# Patient Record
Sex: FEMALE | URBAN - METROPOLITAN AREA
[De-identification: names, ages, dates, MRNs, and addresses within clinical notes are randomized per-mention and may not be internally consistent; named-entity substitution may affect disease eponyms.]

---

## 2024-06-25 ENCOUNTER — INPATIENT (INPATIENT)
Facility: HOSPITAL | Age: 59
LOS: 14 days | Discharge: HOME | End: 2024-07-10
Attending: STUDENT IN AN ORGANIZED HEALTH CARE EDUCATION/TRAINING PROGRAM | Admitting: STUDENT IN AN ORGANIZED HEALTH CARE EDUCATION/TRAINING PROGRAM
Payer: MEDICARE

## 2024-06-25 VITALS
RESPIRATION RATE: 16 BRPM | HEART RATE: 97 BPM | HEIGHT: 66 IN | SYSTOLIC BLOOD PRESSURE: 115 MMHG | DIASTOLIC BLOOD PRESSURE: 82 MMHG | TEMPERATURE: 98 F | WEIGHT: 110.01 LBS

## 2024-06-25 DIAGNOSIS — F33.9 MAJOR DEPRESSIVE DISORDER, RECURRENT, UNSPECIFIED: ICD-10-CM

## 2024-06-25 DIAGNOSIS — I10 ESSENTIAL (PRIMARY) HYPERTENSION: ICD-10-CM

## 2024-06-25 DIAGNOSIS — M32.9 SYSTEMIC LUPUS ERYTHEMATOSUS, UNSPECIFIED: ICD-10-CM

## 2024-06-25 RX ORDER — NICOTINE POLACRILEX 2 MG/1
2 LOZENGE ORAL ONCE
Refills: 0 | Status: COMPLETED | OUTPATIENT
Start: 2024-06-25 | End: 2024-06-25

## 2024-06-25 RX ORDER — OLANZAPINE 2.5 MG/1
5 TABLET, FILM COATED ORAL EVERY 8 HOURS
Refills: 0 | Status: DISCONTINUED | OUTPATIENT
Start: 2024-06-25 | End: 2024-07-10

## 2024-06-25 RX ORDER — NICOTINE POLACRILEX 2 MG/1
1 LOZENGE ORAL DAILY
Refills: 0 | Status: DISCONTINUED | OUTPATIENT
Start: 2024-06-25 | End: 2024-06-28

## 2024-06-25 RX ORDER — ACETAMINOPHEN 325 MG
650 TABLET ORAL EVERY 6 HOURS
Refills: 0 | Status: DISCONTINUED | OUTPATIENT
Start: 2024-06-25 | End: 2024-07-10

## 2024-06-25 RX ORDER — OLANZAPINE 2.5 MG/1
5 TABLET, FILM COATED ORAL AT BEDTIME
Refills: 0 | Status: DISCONTINUED | OUTPATIENT
Start: 2024-06-25 | End: 2024-07-05

## 2024-06-25 RX ADMIN — Medication 650 MILLIGRAM(S): at 21:58

## 2024-06-25 RX ADMIN — NICOTINE POLACRILEX 2 MILLIGRAM(S): 2 LOZENGE ORAL at 21:07

## 2024-06-25 RX ADMIN — Medication 650 MILLIGRAM(S): at 21:06
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
## 2024-06-25 NOTE — BH INPATIENT PSYCHIATRY ASSESSMENT NOTE - RISK ASSESSMENT
chronic risk factors include schizophrenia, hx of noncompliance, poor self care   no prior hx of SI/SA

## 2024-06-25 NOTE — BH PATIENT PROFILE - LAST ORAL INTAKE
[Thin] : thin [Normal] : affect appropriate [de-identified] : bilateral hearing aids [de-identified] : RRR, S1, S2, ?murmur, no rubs or gallops, occasional ectopic [de-identified] : multiple surgical scars [de-identified] : s/p left patellar fracture,  s/p right patellar fracture, s/p left fifth finger fracture, s/p right elbow fracture,  s/p left wrist fracture [de-identified] : multiple scars on abdomen,  multiple senile keratoses, scar right knee, scar left knee, soft tissue mass 2 cm on scalp unchanged 25-Jun-2024 10:45

## 2024-06-25 NOTE — BH INPATIENT PSYCHIATRY ASSESSMENT NOTE - NSBHASSESSSUMMFT_PSY_ALL_CORE
60 y/o F, domiciled alone in private residence unemployed, supportive by her sister Odalys ([phone]) who lives in [place] and is her legal guardian, PMHx for Lupus, PPHx schizophrenia, prior psych admission, last admitted to a hospital in [place] earlier this year, no known SA/NSSIB, no hx of aggression, no known substance use, no current adherent with meds or connected to OP care, who self presents to [facility] ED report recurrent bladder pain and was found to have possible UTI (however U/A was contimated as per [facility] hospitalist). On initial interview in the ED, pt was disorgnaized, paranoid, illogical and collateral from sister indicates this is how she presents when she has decompensated. she recently has not been eating or sleeping well and unable to care for safe. The pt runs off to different cities in the past when she decpmensates, where she finds herself homeless and in vulnerable situation. AS such, presented as an imminent danger to self and requires further hospitalizaton for stabilization, transferred to [facility].    #schizophrenia  -Start Zyprexa 5 mg qhs    #lupus  -f/u hospitalist, need collateral about prior tx     #UTI  -initial UA sample contaminated as per hospitalist, will get U/A + culture prior to treating as no sxs of sepsis at this time

## 2024-06-25 NOTE — BH PATIENT PROFILE - FALL HARM RISK - HARM RISK INTERVENTIONS

## 2024-06-25 NOTE — BH INPATIENT PSYCHIATRY ASSESSMENT NOTE - NSBHCHARTREVIEWVS_PSY_A_CORE FT
Vital Signs Last 24 Hrs  T(C): --  T(F): --  HR: --  BP: --  BP(mean): --  RR: --  SpO2: --     Vital Signs Last 24 Hrs  T(C): 36.7 (06-25-24 @ 14:20), Max: 36.7 (06-25-24 @ 14:20)  T(F): 98 (06-25-24 @ 14:20), Max: 98 (06-25-24 @ 14:20)  HR: 97 (06-25-24 @ 14:20) (97 - 97)  BP: 115/82 (06-25-24 @ 14:20) (115/82 - 115/82)  BP(mean): --  RR: 16 (06-25-24 @ 14:20) (16 - 16)  SpO2: --

## 2024-06-25 NOTE — CHART NOTE - NSCHARTNOTEFT_GEN_A_CORE
Patient seen and assessed upon admission to [facility]. Pt reports the following medications as part of her daily regimen.  -Fish oil- Patient states she takes 4 (1200mg) tabs in the morning and at night  -Vitamin D tab X1 daily  -Baby aspirin 81mg X1 daily  -Excedrin Migraine as needed  Patient hemodynamically stable at this time. PRN Acetaminophen ordered for pain. Primary team to follow up. Will continue to monitor.

## 2024-06-25 NOTE — BH INPATIENT PSYCHIATRY ASSESSMENT NOTE - CURRENT MEDICATION
MEDICATIONS  (STANDING):  nicotine -   7 mG/24Hr(s) Patch 1 Patch Transdermal daily  OLANZapine 5 milliGRAM(s) Oral at bedtime    MEDICATIONS  (PRN):  OLANZapine 5 milliGRAM(s) Oral every 8 hours PRN agitation

## 2024-06-25 NOTE — BH INPATIENT PSYCHIATRY ASSESSMENT NOTE - NSBHMETABOLIC_PSY_ALL_CORE_FT
BMI:   HbA1c:   Glucose:   BP: --Vital Signs Last 24 Hrs  T(C): --  T(F): --  HR: --  BP: --  BP(mean): --  RR: --  SpO2: --      Lipid Panel:  BMI: BMI (kg/m2): 17.8 (06-25-24 @ 14:20)  HbA1c:   Glucose:   BP: 115/82 (06-25-24 @ 14:20) (115/82 - 115/82)Vital Signs Last 24 Hrs  T(C): 36.7 (06-25-24 @ 14:20), Max: 36.7 (06-25-24 @ 14:20)  T(F): 98 (06-25-24 @ 14:20), Max: 98 (06-25-24 @ 14:20)  HR: 97 (06-25-24 @ 14:20) (97 - 97)  BP: 115/82 (06-25-24 @ 14:20) (115/82 - 115/82)  BP(mean): --  RR: 16 (06-25-24 @ 14:20) (16 - 16)  SpO2: --      Lipid Panel:

## 2024-06-26 LAB
APPEARANCE UR: CLEAR — SIGNIFICANT CHANGE UP
BACTERIA # UR AUTO: NEGATIVE /HPF — SIGNIFICANT CHANGE UP
BILIRUB UR-MCNC: NEGATIVE — SIGNIFICANT CHANGE UP
CAST: 0 /LPF — SIGNIFICANT CHANGE UP (ref 0–4)
COLOR SPEC: YELLOW — SIGNIFICANT CHANGE UP
DIFF PNL FLD: ABNORMAL
GLUCOSE UR QL: NEGATIVE MG/DL — SIGNIFICANT CHANGE UP
KETONES UR-MCNC: NEGATIVE MG/DL — SIGNIFICANT CHANGE UP
LEUKOCYTE ESTERASE UR-ACNC: NEGATIVE — SIGNIFICANT CHANGE UP
NITRITE UR-MCNC: NEGATIVE — SIGNIFICANT CHANGE UP
PH UR: 7 — SIGNIFICANT CHANGE UP (ref 5–8)
PROT UR-MCNC: NEGATIVE MG/DL — SIGNIFICANT CHANGE UP
RBC CASTS # UR COMP ASSIST: 6 /HPF — HIGH (ref 0–4)
REVIEW: SIGNIFICANT CHANGE UP
SP GR SPEC: 1.01 — SIGNIFICANT CHANGE UP (ref 1–1.03)
SQUAMOUS # UR AUTO: 0 /HPF — SIGNIFICANT CHANGE UP (ref 0–5)
UROBILINOGEN FLD QL: 0.2 MG/DL — SIGNIFICANT CHANGE UP (ref 0.2–1)
WBC UR QL: 0 /HPF — SIGNIFICANT CHANGE UP (ref 0–5)

## 2024-06-26 RX ORDER — CRANBERRY FRUIT EXTRACT 650 MG
4 CAPSULE ORAL DAILY
Refills: 0 | Status: DISCONTINUED | OUTPATIENT
Start: 2024-06-26 | End: 2024-07-10

## 2024-06-26 RX ORDER — ASPIRIN 325 MG/1
81 TABLET, FILM COATED ORAL DAILY
Refills: 0 | Status: DISCONTINUED | OUTPATIENT
Start: 2024-06-26 | End: 2024-07-10

## 2024-06-26 RX ORDER — NICOTINE POLACRILEX 2 MG/1
2 LOZENGE ORAL
Refills: 0 | Status: DISCONTINUED | OUTPATIENT
Start: 2024-06-26 | End: 2024-07-10

## 2024-06-26 RX ADMIN — NICOTINE POLACRILEX 2 MILLIGRAM(S): 2 LOZENGE ORAL at 14:19

## 2024-06-26 RX ADMIN — Medication 650 MILLIGRAM(S): at 06:11

## 2024-06-26 RX ADMIN — NICOTINE POLACRILEX 2 MILLIGRAM(S): 2 LOZENGE ORAL at 17:18

## 2024-06-26 RX ADMIN — NICOTINE POLACRILEX 2 MILLIGRAM(S): 2 LOZENGE ORAL at 10:32

## 2024-06-26 RX ADMIN — Medication 4 GRAM(S): at 10:45

## 2024-06-26 RX ADMIN — Medication 650 MILLIGRAM(S): at 05:23

## 2024-06-26 RX ADMIN — ASPIRIN 81 MILLIGRAM(S): 325 TABLET, FILM COATED ORAL at 17:37

## 2024-06-26 RX ADMIN — Medication 650 MILLIGRAM(S): at 20:47

## 2024-06-26 NOTE — PSYCHIATRIC REHAB INITIAL EVALUATION - NSBHEDULEVEL_PSY_ALL_CORE
Topic:  Vaginal irritation     Pelvic examination was essentially unremarkable for any erythematous changes     Will prescribe Topicort for symptomatic relief of introital discomfort    Also suggested warm tea bags baths as well     All questions answered for the patient     Patient will return for routine follow-up as planned     She was told to call for any problems, questions, issues or concerns which may arise for her [facility]

## 2024-06-26 NOTE — CONSULT NOTE ADULT - ASSESSMENT
59 y.o. F w/ a hx of MDD, schizophrenia, SLE, osteoarthritis who presents from [place] ED for decompensated schizophrenia.   Medicine consulted for positive U/A at OSH ER.     # Abnormal U/A, resolved   Patient's prior U/A at OSH ER was contaminated with epithelial cells, would not consider it to be positive/infected. Patient without any symptoms and repeat U/A today negative for signs of infection.   [ ] Follow up OP for repeat U/A to ensure microscopic hematuria resolved. If it does not, patient will need to see Urology     # SLE  - Patient reports only taking fish oil, can continue. Unclear status of dx, any other recommended treatment. Does not appear to be in active flare  - Patient mentioned APLA- though reports she only takes ASA. Please continue for now   [ ] Will try to contact OP provider Dr. Mikel Oakley     # MDD   # Schizophrenia   - Management per primary
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
## 2024-06-26 NOTE — CONSULT NOTE ADULT - SUBJECTIVE AND OBJECTIVE BOX
HPI: 59 y.o. F w/ a hx of MDD, schizophrenia, SLE, osteoarthritis who presents from [place] ED for decompensated schizophrenia.   Medicine consulted for positive U/A.   OSH ER chart reviewed- patient with negative leukocyte esterase, 10 WBC with few bacteria, 60 RBC, 8 epithelial cells and squamous cells.   Patient denies dysuria, urgency or frequency.   Reports she has SLE for which she takes fish oil, she has not seen a rheumatologist for years. Reports chronic back and pelvic pain, constant 8/10 cramping bladder pain. Is concerned because she has squamous cells in her U/A.         PAST MEDICAL & SURGICAL HISTORY:  LE (lupus erythematosus)      No significant past surgical history      ROS:    Constitutional: [ ] fevers [ ] chills [ ] weight loss [ ] weight gain  HEENT: [ ] dry eyes [ ] eye irritation [ ] postnasal drip [ ] nasal congestion  CV: [ ] chest pain [ ] orthopnea [ ] palpitations [ ] murmur  Resp: [ ] cough [ ] shortness of breath [ ] dyspnea [ ] wheezing [ ] sputum [ ] hemoptysis  GI: [ ] nausea [ ] vomiting [ ] diarrhea [ ] constipation [x ] abd pain [ ] dysphagia   : [ ] dysuria [ ] nocturia [ ] hematuria [ ] increased urinary frequency  Musculoskeletal: [x ] back pain [ ] myalgias [x ] arthralgias [ ] fracture  Skin: [ ] rash [ ] itch  Neurological: [ ] headache [ ] dizziness [ ] syncope [ ] weakness [ ] numbness  Psychiatric: [ ] anxiety [x ] depression  Endocrine: [ ] diabetes [ ] thyroid problem  Hematologic/Lymphatic: [ ] anemia [ ] bleeding problem  Allergic/Immunologic: [ ] itchy eyes [ ] nasal discharge [ ] hives [ ] angioedema  [x ] All other systems negative  [ ] Unable to assess ROS because ________    Allergies    No Known Allergies    Intolerances        Social History:   Quit tobacco use in  uses nicotine gum daily   Occasional alcohol use   No drug use   FAMILY HISTORY:  Sister- breast cancer       MEDICATIONS  (STANDING):  nicotine -   7 mG/24Hr(s) Patch 1 Patch Transdermal daily  OLANZapine 5 milliGRAM(s) Oral at bedtime  omega-3-Acid Ethyl Esters 4 Gram(s) Oral daily    MEDICATIONS  (PRN):  acetaminophen     Tablet .. 650 milliGRAM(s) Oral every 6 hours PRN Mild Pain (1 - 3), Moderate Pain (4 - 6), Severe Pain (7 - 10)  nicotine  Polacrilex Gum 2 milliGRAM(s) Oral every 2 hours PRN Smoking Cessation  OLANZapine 5 milliGRAM(s) Oral every 8 hours PRN agitation      Vital Signs Last 24 Hrs  T(C): 36.6 (2024 07:35), Max: 36.6 (2024 07:35)  T(F): 97.9 (2024 07:35), Max: 97.9 (2024 07:35)  HR: --  BP: --  BP(mean): --  RR: 16 (2024 07:35) (16 - 16)  SpO2: --      CAPILLARY BLOOD GLUCOSE            PHYSICAL EXAM:  GENERAL: NAD, well-developed thin woman   HEAD:  Atraumatic, Normocephalic  EYES: EOMI, conjunctiva and sclera clear  NECK: Supple, No JVD  CHEST/LUNG: Clear to auscultation bilaterally; No wheeze  HEART: Regular rate and rhythm; No murmurs, rubs, or gallops  ABDOMEN: Soft, Nontender, Nondistended; Bowel sounds present  EXTREMITIES:  No clubbing, cyanosis, or edema  NEUROLOGY: non-focal  SKIN: No rashes or lesions    LABS:                Urinalysis Basic - ( 2024 10:00 )    Color: Yellow / Appearance: Clear / S.010 / pH: x  Gluc: x / Ketone: Negative mg/dL  / Bili: Negative / Urobili: 0.2 mg/dL   Blood: x / Protein: Negative mg/dL / Nitrite: Negative   Leuk Esterase: Negative / RBC: 6 /HPF / WBC 0 /HPF   Sq Epi: x / Non Sq Epi: 0 /HPF / Bacteria: Negative /HPF        EKG(personally reviewed):    RADIOLOGY & ADDITIONAL TESTS:    Imaging Personally Reviewed:    Consultant(s) Notes Reviewed:      Care Discussed with Consultants/Other Providers:
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
## 2024-06-27 LAB
CULTURE RESULTS: NO GROWTH — SIGNIFICANT CHANGE UP
SPECIMEN SOURCE: SIGNIFICANT CHANGE UP

## 2024-06-27 RX ORDER — LORAZEPAM 0.5 MG
1 TABLET ORAL ONCE
Refills: 0 | Status: DISCONTINUED | OUTPATIENT
Start: 2024-06-27 | End: 2024-06-27

## 2024-06-27 RX ORDER — LORAZEPAM 0.5 MG
1 TABLET ORAL
Refills: 0 | Status: DISCONTINUED | OUTPATIENT
Start: 2024-06-27 | End: 2024-07-04

## 2024-06-27 RX ADMIN — NICOTINE POLACRILEX 2 MILLIGRAM(S): 2 LOZENGE ORAL at 07:12

## 2024-06-27 RX ADMIN — NICOTINE POLACRILEX 2 MILLIGRAM(S): 2 LOZENGE ORAL at 21:30

## 2024-06-27 RX ADMIN — Medication 1 MILLIGRAM(S): at 12:24

## 2024-06-27 RX ADMIN — ASPIRIN 81 MILLIGRAM(S): 325 TABLET, FILM COATED ORAL at 08:31

## 2024-06-27 RX ADMIN — NICOTINE POLACRILEX 2 MILLIGRAM(S): 2 LOZENGE ORAL at 14:20

## 2024-06-27 RX ADMIN — Medication 1 MILLIGRAM(S): at 20:37

## 2024-06-27 RX ADMIN — Medication 650 MILLIGRAM(S): at 08:51

## 2024-06-27 RX ADMIN — NICOTINE POLACRILEX 2 MILLIGRAM(S): 2 LOZENGE ORAL at 16:50

## 2024-06-27 RX ADMIN — NICOTINE POLACRILEX 2 MILLIGRAM(S): 2 LOZENGE ORAL at 04:53

## 2024-06-27 RX ADMIN — Medication 650 MILLIGRAM(S): at 20:37

## 2024-06-27 RX ADMIN — Medication 650 MILLIGRAM(S): at 09:51

## 2024-06-27 RX ADMIN — Medication 4 GRAM(S): at 08:31

## 2024-06-27 NOTE — CHART NOTE - NSCHARTNOTEFT_GEN_A_CORE
Called patient's PCP's office for collateral re SLE and ?APLS that patient reportedly manages with fish oil and ASA.   Dr. Mikel Oakley   [phone]     Awaiting call back

## 2024-06-28 RX ADMIN — ASPIRIN 81 MILLIGRAM(S): 325 TABLET, FILM COATED ORAL at 08:31

## 2024-06-28 RX ADMIN — NICOTINE POLACRILEX 2 MILLIGRAM(S): 2 LOZENGE ORAL at 11:24

## 2024-06-28 RX ADMIN — Medication 4 GRAM(S): at 08:31

## 2024-06-28 RX ADMIN — Medication 650 MILLIGRAM(S): at 20:28

## 2024-06-28 RX ADMIN — NICOTINE POLACRILEX 2 MILLIGRAM(S): 2 LOZENGE ORAL at 20:40

## 2024-06-28 RX ADMIN — NICOTINE POLACRILEX 2 MILLIGRAM(S): 2 LOZENGE ORAL at 14:05

## 2024-06-28 RX ADMIN — Medication 1 MILLIGRAM(S): at 20:28

## 2024-06-28 RX ADMIN — NICOTINE POLACRILEX 2 MILLIGRAM(S): 2 LOZENGE ORAL at 08:37

## 2024-06-28 RX ADMIN — NICOTINE POLACRILEX 2 MILLIGRAM(S): 2 LOZENGE ORAL at 18:06

## 2024-06-28 RX ADMIN — Medication 1 MILLIGRAM(S): at 08:31

## 2024-06-28 NOTE — PROGRESS NOTE ADULT - ASSESSMENT
59 y.o. F w/ a hx of MDD, schizophrenia, SLE, osteoarthritis who presents from [place] ED for decompensated schizophrenia.   Medicine consulted for positive U/A at OSH ER.     # Abnormal U/A, resolved   Patient's prior U/A at OSH ER was contaminated with epithelial cells, would not consider it to be positive/infected. Patient without any symptoms and repeat U/A today negative for signs of infection.   [ ] Follow up OP for repeat U/A to ensure microscopic hematuria resolved. If it does not, patient will need to see Urology     # SLE  # ?APS  - Patient reports only taking fish oil, can continue. Unclear status of dx, any other recommended treatment. Does not appear to be in active flare  - Patient mentioned APLA- though reports she only takes ASA. Please continue for now   - Called Dr. Mikel Oakley's office at [phone] who did not have the patient in their system. Patient now reporting last name is loki Cervantes and  is 1965. Called office x3 for collateral re PMH with new /name, awaiting call back. Patient refused to allow writer to call sister for collateral     # MDD   # Schizophrenia   - Management per primary

## 2024-06-28 NOTE — PROGRESS NOTE ADULT - SUBJECTIVE AND OBJECTIVE BOX
Merlin Mathew, MD   Hospitalist  Pager #[phone]    PROGRESS NOTE:     Patient is a 59y old  Female who presents with a chief complaint of Decompensated schizophrenia (2024 14:21)      SUBJECTIVE / OVERNIGHT EVENTS:  NEON, patient wants to be discharged   Reports her name is jay Pickett and  is 1965. Reports Dr. Oakley is her PCP     ADDITIONAL REVIEW OF SYSTEMS:    MEDICATIONS  (STANDING):  aspirin  chewable 81 milliGRAM(s) Oral daily  LORazepam     Tablet 1 milliGRAM(s) Oral two times a day  OLANZapine 5 milliGRAM(s) Oral at bedtime  omega-3-Acid Ethyl Esters 4 Gram(s) Oral daily    MEDICATIONS  (PRN):  acetaminophen     Tablet .. 650 milliGRAM(s) Oral every 6 hours PRN Mild Pain (1 - 3), Moderate Pain (4 - 6), Severe Pain (7 - 10)  nicotine  Polacrilex Gum 2 milliGRAM(s) Oral every 2 hours PRN Smoking Cessation  OLANZapine 5 milliGRAM(s) Oral every 8 hours PRN agitation      CAPILLARY BLOOD GLUCOSE        I&O's Summary      PHYSICAL EXAM:  Vital Signs Last 24 Hrs  T(C): 36.6 (2024 07:35), Max: 36.6 (2024 07:35)  T(F): 97.8 (2024 07:35), Max: 97.8 (2024 07:35)  HR: --  BP: --  BP(mean): --  RR: 17 (2024 07:35) (17 - 17)  SpO2: --        CONSTITUTIONAL: NAD, resting comfortably   RESPIRATORY: Normal respiratory effort; lungs are clear to auscultation bilaterally  CARDIOVASCULAR: Regular rate and rhythm, normal S1 and S2, no murmur/rub/gallop; No lower extremity edema;   ABDOMEN: Nontender to palpation, normoactive bowel sounds, no rebound/guarding; No hepatosplenomegaly  MUSCULOSKELETAL no clubbing or cyanosis of digits; no joint swelling or tenderness to palpation  Neuro: Moving all 4 extremities equally   LABS:                    Culture - Urine (collected 2024 11:30)  Source: Clean Catch Clean Catch (Midstream)  Final Report (2024 22:48):    No growth        RADIOLOGY & ADDITIONAL TESTS:  Results Reviewed:   Imaging Personally Reviewed:  Electrocardiogram Personally Reviewed:    COORDINATION OF CARE:  Care Discussed with Consultants/Other Providers [Y/N]:  Prior or Outpatient Records Reviewed [Y/N]:

## 2024-06-29 RX ADMIN — NICOTINE POLACRILEX 2 MILLIGRAM(S): 2 LOZENGE ORAL at 09:37

## 2024-06-29 RX ADMIN — NICOTINE POLACRILEX 2 MILLIGRAM(S): 2 LOZENGE ORAL at 18:20

## 2024-06-29 RX ADMIN — Medication 1 MILLIGRAM(S): at 20:24

## 2024-06-29 RX ADMIN — Medication 650 MILLIGRAM(S): at 20:24

## 2024-06-29 RX ADMIN — ASPIRIN 81 MILLIGRAM(S): 325 TABLET, FILM COATED ORAL at 08:35

## 2024-06-29 RX ADMIN — NICOTINE POLACRILEX 2 MILLIGRAM(S): 2 LOZENGE ORAL at 13:04

## 2024-06-29 RX ADMIN — Medication 1 MILLIGRAM(S): at 08:35

## 2024-06-29 RX ADMIN — Medication 4 GRAM(S): at 08:35

## 2024-06-30 RX ADMIN — NICOTINE POLACRILEX 2 MILLIGRAM(S): 2 LOZENGE ORAL at 11:13

## 2024-06-30 RX ADMIN — NICOTINE POLACRILEX 2 MILLIGRAM(S): 2 LOZENGE ORAL at 07:10

## 2024-06-30 RX ADMIN — NICOTINE POLACRILEX 2 MILLIGRAM(S): 2 LOZENGE ORAL at 18:28

## 2024-06-30 RX ADMIN — NICOTINE POLACRILEX 2 MILLIGRAM(S): 2 LOZENGE ORAL at 15:34

## 2024-06-30 RX ADMIN — Medication 1 MILLIGRAM(S): at 20:01

## 2024-06-30 RX ADMIN — Medication 4 GRAM(S): at 08:24

## 2024-06-30 RX ADMIN — NICOTINE POLACRILEX 2 MILLIGRAM(S): 2 LOZENGE ORAL at 20:53

## 2024-06-30 RX ADMIN — Medication 1 MILLIGRAM(S): at 08:24

## 2024-06-30 RX ADMIN — NICOTINE POLACRILEX 2 MILLIGRAM(S): 2 LOZENGE ORAL at 13:26

## 2024-06-30 RX ADMIN — ASPIRIN 81 MILLIGRAM(S): 325 TABLET, FILM COATED ORAL at 08:24

## 2024-07-01 RX ADMIN — Medication 1 MILLIGRAM(S): at 08:27

## 2024-07-01 RX ADMIN — NICOTINE POLACRILEX 2 MILLIGRAM(S): 2 LOZENGE ORAL at 20:24

## 2024-07-01 RX ADMIN — NICOTINE POLACRILEX 2 MILLIGRAM(S): 2 LOZENGE ORAL at 17:41

## 2024-07-01 RX ADMIN — Medication 650 MILLIGRAM(S): at 08:23

## 2024-07-01 RX ADMIN — NICOTINE POLACRILEX 2 MILLIGRAM(S): 2 LOZENGE ORAL at 08:39

## 2024-07-01 RX ADMIN — Medication 1 MILLIGRAM(S): at 20:24

## 2024-07-01 RX ADMIN — ASPIRIN 81 MILLIGRAM(S): 325 TABLET, FILM COATED ORAL at 08:27

## 2024-07-01 RX ADMIN — Medication 650 MILLIGRAM(S): at 18:15

## 2024-07-01 RX ADMIN — NICOTINE POLACRILEX 2 MILLIGRAM(S): 2 LOZENGE ORAL at 06:47

## 2024-07-01 RX ADMIN — NICOTINE POLACRILEX 2 MILLIGRAM(S): 2 LOZENGE ORAL at 13:41

## 2024-07-01 RX ADMIN — Medication 4 GRAM(S): at 08:26

## 2024-07-02 RX ADMIN — ASPIRIN 81 MILLIGRAM(S): 325 TABLET, FILM COATED ORAL at 08:32

## 2024-07-02 RX ADMIN — Medication 650 MILLIGRAM(S): at 08:35

## 2024-07-02 RX ADMIN — Medication 650 MILLIGRAM(S): at 17:13

## 2024-07-02 RX ADMIN — NICOTINE POLACRILEX 2 MILLIGRAM(S): 2 LOZENGE ORAL at 20:35

## 2024-07-02 RX ADMIN — NICOTINE POLACRILEX 2 MILLIGRAM(S): 2 LOZENGE ORAL at 23:56

## 2024-07-02 RX ADMIN — NICOTINE POLACRILEX 2 MILLIGRAM(S): 2 LOZENGE ORAL at 14:27

## 2024-07-02 RX ADMIN — Medication 4 GRAM(S): at 08:32

## 2024-07-02 RX ADMIN — Medication 1 MILLIGRAM(S): at 08:32

## 2024-07-02 RX ADMIN — Medication 650 MILLIGRAM(S): at 09:13

## 2024-07-02 RX ADMIN — NICOTINE POLACRILEX 2 MILLIGRAM(S): 2 LOZENGE ORAL at 08:32

## 2024-07-02 RX ADMIN — Medication 650 MILLIGRAM(S): at 18:14

## 2024-07-02 RX ADMIN — Medication 1 MILLIGRAM(S): at 20:24

## 2024-07-03 RX ADMIN — NICOTINE POLACRILEX 2 MILLIGRAM(S): 2 LOZENGE ORAL at 09:14

## 2024-07-03 RX ADMIN — Medication 1 MILLIGRAM(S): at 08:19

## 2024-07-03 RX ADMIN — NICOTINE POLACRILEX 2 MILLIGRAM(S): 2 LOZENGE ORAL at 11:40

## 2024-07-03 RX ADMIN — NICOTINE POLACRILEX 2 MILLIGRAM(S): 2 LOZENGE ORAL at 14:47

## 2024-07-03 RX ADMIN — NICOTINE POLACRILEX 2 MILLIGRAM(S): 2 LOZENGE ORAL at 19:19

## 2024-07-03 RX ADMIN — Medication 650 MILLIGRAM(S): at 19:19

## 2024-07-03 RX ADMIN — Medication 1000 UNIT(S): at 08:19

## 2024-07-03 RX ADMIN — ASPIRIN 81 MILLIGRAM(S): 325 TABLET, FILM COATED ORAL at 08:19

## 2024-07-03 RX ADMIN — NICOTINE POLACRILEX 2 MILLIGRAM(S): 2 LOZENGE ORAL at 17:40

## 2024-07-03 RX ADMIN — Medication 4 GRAM(S): at 08:19

## 2024-07-03 RX ADMIN — Medication 1 MILLIGRAM(S): at 20:05

## 2024-07-04 RX ADMIN — NICOTINE POLACRILEX 2 MILLIGRAM(S): 2 LOZENGE ORAL at 06:08

## 2024-07-04 RX ADMIN — Medication 1 MILLIGRAM(S): at 20:45

## 2024-07-04 RX ADMIN — Medication 4 GRAM(S): at 08:36

## 2024-07-04 RX ADMIN — NICOTINE POLACRILEX 2 MILLIGRAM(S): 2 LOZENGE ORAL at 16:03

## 2024-07-04 RX ADMIN — ASPIRIN 81 MILLIGRAM(S): 325 TABLET, FILM COATED ORAL at 08:37

## 2024-07-04 RX ADMIN — NICOTINE POLACRILEX 2 MILLIGRAM(S): 2 LOZENGE ORAL at 20:45

## 2024-07-04 RX ADMIN — Medication 1000 UNIT(S): at 08:37

## 2024-07-04 RX ADMIN — Medication 1 MILLIGRAM(S): at 08:37

## 2024-07-04 RX ADMIN — Medication 650 MILLIGRAM(S): at 08:36

## 2024-07-04 RX ADMIN — NICOTINE POLACRILEX 2 MILLIGRAM(S): 2 LOZENGE ORAL at 12:42

## 2024-07-04 RX ADMIN — NICOTINE POLACRILEX 2 MILLIGRAM(S): 2 LOZENGE ORAL at 18:12

## 2024-07-04 RX ADMIN — NICOTINE POLACRILEX 2 MILLIGRAM(S): 2 LOZENGE ORAL at 08:37

## 2024-07-04 RX ADMIN — Medication 650 MILLIGRAM(S): at 09:37

## 2024-07-05 RX ORDER — LIDOCAINE HCL 28 MG/G
1 GEL TOPICAL
Refills: 0 | Status: COMPLETED | OUTPATIENT
Start: 2024-07-05 | End: 2024-07-08

## 2024-07-05 RX ORDER — LORAZEPAM 0.5 MG
1 TABLET ORAL
Refills: 0 | Status: DISCONTINUED | OUTPATIENT
Start: 2024-07-05 | End: 2024-07-10

## 2024-07-05 RX ADMIN — Medication 4 GRAM(S): at 08:30

## 2024-07-05 RX ADMIN — NICOTINE POLACRILEX 2 MILLIGRAM(S): 2 LOZENGE ORAL at 08:41

## 2024-07-05 RX ADMIN — LIDOCAINE HCL 1 APPLICATION(S): 28 GEL TOPICAL at 18:39

## 2024-07-05 RX ADMIN — Medication 1000 UNIT(S): at 08:30

## 2024-07-05 RX ADMIN — NICOTINE POLACRILEX 2 MILLIGRAM(S): 2 LOZENGE ORAL at 19:58

## 2024-07-05 RX ADMIN — NICOTINE POLACRILEX 2 MILLIGRAM(S): 2 LOZENGE ORAL at 06:39

## 2024-07-05 RX ADMIN — ASPIRIN 81 MILLIGRAM(S): 325 TABLET, FILM COATED ORAL at 08:30

## 2024-07-05 RX ADMIN — NICOTINE POLACRILEX 2 MILLIGRAM(S): 2 LOZENGE ORAL at 15:38

## 2024-07-05 RX ADMIN — Medication 650 MILLIGRAM(S): at 16:43

## 2024-07-05 RX ADMIN — Medication 1 MILLIGRAM(S): at 19:58

## 2024-07-05 RX ADMIN — NICOTINE POLACRILEX 2 MILLIGRAM(S): 2 LOZENGE ORAL at 13:13

## 2024-07-05 RX ADMIN — NICOTINE POLACRILEX 2 MILLIGRAM(S): 2 LOZENGE ORAL at 22:18

## 2024-07-05 RX ADMIN — Medication 1 MILLIGRAM(S): at 08:38

## 2024-07-05 RX ADMIN — Medication 650 MILLIGRAM(S): at 16:13

## 2024-07-06 RX ADMIN — NICOTINE POLACRILEX 2 MILLIGRAM(S): 2 LOZENGE ORAL at 12:56

## 2024-07-06 RX ADMIN — NICOTINE POLACRILEX 2 MILLIGRAM(S): 2 LOZENGE ORAL at 20:35

## 2024-07-06 RX ADMIN — ASPIRIN 81 MILLIGRAM(S): 325 TABLET, FILM COATED ORAL at 08:36

## 2024-07-06 RX ADMIN — NICOTINE POLACRILEX 2 MILLIGRAM(S): 2 LOZENGE ORAL at 14:57

## 2024-07-06 RX ADMIN — NICOTINE POLACRILEX 2 MILLIGRAM(S): 2 LOZENGE ORAL at 17:23

## 2024-07-06 RX ADMIN — LIDOCAINE HCL 1 APPLICATION(S): 28 GEL TOPICAL at 13:04

## 2024-07-06 RX ADMIN — Medication 1 MILLIGRAM(S): at 20:34

## 2024-07-06 RX ADMIN — Medication 4 GRAM(S): at 08:35

## 2024-07-06 RX ADMIN — Medication 1 MILLIGRAM(S): at 08:35

## 2024-07-06 RX ADMIN — Medication 1000 UNIT(S): at 08:36

## 2024-07-06 RX ADMIN — NICOTINE POLACRILEX 2 MILLIGRAM(S): 2 LOZENGE ORAL at 07:44

## 2024-07-07 RX ADMIN — NICOTINE POLACRILEX 2 MILLIGRAM(S): 2 LOZENGE ORAL at 17:53

## 2024-07-07 RX ADMIN — Medication 4 GRAM(S): at 08:15

## 2024-07-07 RX ADMIN — Medication 1 MILLIGRAM(S): at 20:38

## 2024-07-07 RX ADMIN — Medication 1 MILLIGRAM(S): at 08:15

## 2024-07-07 RX ADMIN — NICOTINE POLACRILEX 2 MILLIGRAM(S): 2 LOZENGE ORAL at 08:55

## 2024-07-07 RX ADMIN — NICOTINE POLACRILEX 2 MILLIGRAM(S): 2 LOZENGE ORAL at 13:40

## 2024-07-07 RX ADMIN — NICOTINE POLACRILEX 2 MILLIGRAM(S): 2 LOZENGE ORAL at 20:38

## 2024-07-07 RX ADMIN — ASPIRIN 81 MILLIGRAM(S): 325 TABLET, FILM COATED ORAL at 08:16

## 2024-07-07 RX ADMIN — Medication 1000 UNIT(S): at 08:15

## 2024-07-08 RX ADMIN — Medication 1 MILLIGRAM(S): at 20:58

## 2024-07-08 RX ADMIN — Medication 1000 UNIT(S): at 08:20

## 2024-07-08 RX ADMIN — Medication 1 MILLIGRAM(S): at 08:21

## 2024-07-08 RX ADMIN — Medication 650 MILLIGRAM(S): at 11:32

## 2024-07-08 RX ADMIN — Medication 650 MILLIGRAM(S): at 12:32

## 2024-07-08 RX ADMIN — LIDOCAINE HCL 1 APPLICATION(S): 28 GEL TOPICAL at 21:05

## 2024-07-08 RX ADMIN — NICOTINE POLACRILEX 2 MILLIGRAM(S): 2 LOZENGE ORAL at 18:10

## 2024-07-08 RX ADMIN — NICOTINE POLACRILEX 2 MILLIGRAM(S): 2 LOZENGE ORAL at 16:04

## 2024-07-08 RX ADMIN — NICOTINE POLACRILEX 2 MILLIGRAM(S): 2 LOZENGE ORAL at 21:06

## 2024-07-08 RX ADMIN — NICOTINE POLACRILEX 2 MILLIGRAM(S): 2 LOZENGE ORAL at 11:09

## 2024-07-08 RX ADMIN — NICOTINE POLACRILEX 2 MILLIGRAM(S): 2 LOZENGE ORAL at 08:33

## 2024-07-08 RX ADMIN — Medication 4 GRAM(S): at 08:20

## 2024-07-08 RX ADMIN — NICOTINE POLACRILEX 2 MILLIGRAM(S): 2 LOZENGE ORAL at 14:01

## 2024-07-08 RX ADMIN — ASPIRIN 81 MILLIGRAM(S): 325 TABLET, FILM COATED ORAL at 08:20

## 2024-07-09 RX ORDER — CRANBERRY FRUIT EXTRACT 650 MG
4 CAPSULE ORAL
Qty: 120 | Refills: 0
Start: 2024-07-09 | End: 2024-08-07

## 2024-07-09 RX ORDER — CRANBERRY FRUIT EXTRACT 650 MG
4 CAPSULE ORAL
Qty: 0 | Refills: 0 | DISCHARGE
Start: 2024-07-09

## 2024-07-09 RX ORDER — ASPIRIN 325 MG/1
1 TABLET, FILM COATED ORAL
Qty: 0 | Refills: 0 | DISCHARGE
Start: 2024-07-09

## 2024-07-09 RX ORDER — LORAZEPAM 0.5 MG
1 TABLET ORAL
Qty: 30 | Refills: 0
Start: 2024-07-09 | End: 2024-07-23

## 2024-07-09 RX ADMIN — Medication 650 MILLIGRAM(S): at 09:15

## 2024-07-09 RX ADMIN — NICOTINE POLACRILEX 2 MILLIGRAM(S): 2 LOZENGE ORAL at 20:39

## 2024-07-09 RX ADMIN — NICOTINE POLACRILEX 2 MILLIGRAM(S): 2 LOZENGE ORAL at 12:45

## 2024-07-09 RX ADMIN — NICOTINE POLACRILEX 2 MILLIGRAM(S): 2 LOZENGE ORAL at 18:27

## 2024-07-09 RX ADMIN — Medication 1 MILLIGRAM(S): at 08:40

## 2024-07-09 RX ADMIN — Medication 650 MILLIGRAM(S): at 08:40

## 2024-07-09 RX ADMIN — Medication 1 MILLIGRAM(S): at 20:39

## 2024-07-09 RX ADMIN — ASPIRIN 81 MILLIGRAM(S): 325 TABLET, FILM COATED ORAL at 08:40

## 2024-07-09 RX ADMIN — NICOTINE POLACRILEX 2 MILLIGRAM(S): 2 LOZENGE ORAL at 15:15

## 2024-07-09 RX ADMIN — NICOTINE POLACRILEX 2 MILLIGRAM(S): 2 LOZENGE ORAL at 08:40

## 2024-07-09 RX ADMIN — Medication 4 GRAM(S): at 08:40

## 2024-07-09 RX ADMIN — Medication 1000 UNIT(S): at 08:40

## 2024-07-09 RX ADMIN — NICOTINE POLACRILEX 2 MILLIGRAM(S): 2 LOZENGE ORAL at 00:37

## 2024-07-09 NOTE — BH INPATIENT PSYCHIATRY PROGRESS NOTE - NSTXMEDICDATEEST_PSY_ALL_CORE
26-Jun-2024
26-Jun-2024
03-Jul-2024
26-Jun-2024

## 2024-07-09 NOTE — BH INPATIENT PSYCHIATRY PROGRESS NOTE - NSBHCHARTREVIEWVS_PSY_A_CORE FT
Vital Signs Last 24 Hrs  T(C): 36.2 (07-08-24 @ 08:30), Max: 36.3 (07-07-24 @ 19:37)  T(F): 97.2 (07-08-24 @ 08:30), Max: 97.4 (07-07-24 @ 19:37)  HR: --  BP: --  BP(mean): --  RR: 17 (07-08-24 @ 07:26) (17 - 17)  SpO2: --    Orthostatic VS  07-08-24 @ 08:30  Lying BP: --/-- HR: --  Sitting BP: 126/60 HR: 98  Standing BP: 117/58 HR: 107  Site: upper left arm  Mode: electronic  Orthostatic VS  07-07-24 @ 19:37  Lying BP: --/-- HR: --  Sitting BP: 142/75 HR: 88  Standing BP: 135/79 HR: 98  Site: --  Mode: electronic  Orthostatic VS  07-07-24 @ 07:47  Lying BP: --/-- HR: --  Sitting BP: 112/63 HR: 90  Standing BP: 106/60 HR: 108  Site: --  Mode: --  Orthostatic VS  07-06-24 @ 19:26  Lying BP: --/-- HR: --  Sitting BP: 137/81 HR: 87  Standing BP: 135/85 HR: 90  Site: --  Mode: --  
Vital Signs Last 24 Hrs  T(C): 36.6 (07-02-24 @ 08:04), Max: 36.6 (07-02-24 @ 08:04)  T(F): 97.8 (07-02-24 @ 08:04), Max: 97.8 (07-02-24 @ 08:04)  HR: --  BP: --  BP(mean): --  RR: 18 (07-02-24 @ 08:04) (18 - 18)  SpO2: --    Orthostatic VS  07-02-24 @ 08:04  Lying BP: --/-- HR: --  Sitting BP: 122/73 HR: 83  Standing BP: 112/73 HR: 98  Site: --  Mode: --  Orthostatic VS  07-01-24 @ 19:14  Lying BP: --/-- HR: --  Sitting BP: 124/75 HR: 69  Standing BP: 127/91 HR: 86  Site: --  Mode: --  Orthostatic VS  07-01-24 @ 07:59  Lying BP: --/-- HR: --  Sitting BP: 120/71 HR: 89  Standing BP: 121/76 HR: 100  Site: upper left arm  Mode: electronic  Orthostatic VS  06-30-24 @ 19:08  Lying BP: --/-- HR: --  Sitting BP: 125/75 HR: 78  Standing BP: 112/78 HR: 94  Site: --  Mode: --  
Vital Signs Last 24 Hrs  T(C): 36.6 (06-26-24 @ 07:35), Max: 36.7 (06-25-24 @ 14:20)  T(F): 97.9 (06-26-24 @ 07:35), Max: 98 (06-25-24 @ 14:20)  HR: 97 (06-25-24 @ 14:20) (97 - 97)  BP: 115/82 (06-25-24 @ 14:20) (115/82 - 115/82)  BP(mean): --  RR: 16 (06-26-24 @ 07:35) (16 - 16)  SpO2: --    Orthostatic VS  06-26-24 @ 07:35  Lying BP: --/-- HR: --  Sitting BP: 111/79 HR: 74  Standing BP: 123/77 HR: 89  Site: --  Mode: electronic  Orthostatic VS  06-25-24 @ 19:17  Lying BP: --/-- HR: --  Sitting BP: 107/68 HR: 109  Standing BP: 99/61 HR: 119  Site: --  Mode: --  
Vital Signs Last 24 Hrs  T(C): 37.1 (07-05-24 @ 07:35), Max: 37.1 (07-05-24 @ 07:35)  T(F): 98.8 (07-05-24 @ 07:35), Max: 98.8 (07-05-24 @ 07:35)  HR: --  BP: --  BP(mean): --  RR: 17 (07-05-24 @ 07:35) (17 - 17)  SpO2: --    Orthostatic VS  07-05-24 @ 07:35  Lying BP: --/-- HR: --  Sitting BP: 119/75 HR: 95  Standing BP: 107/62 HR: 91  Site: upper left arm  Mode: electronic  Orthostatic VS  07-04-24 @ 19:26  Lying BP: --/-- HR: --  Sitting BP: 116/76 HR: 76  Standing BP: 108/75 HR: 97  Site: --  Mode: --  Orthostatic VS  07-04-24 @ 08:55  Lying BP: --/-- HR: --  Sitting BP: 128/71 HR: 89  Standing BP: 126/74 HR: 103  Site: --  Mode: --  Orthostatic VS  07-03-24 @ 19:13  Lying BP: --/-- HR: --  Sitting BP: 134/73 HR: 82  Standing BP: 110/78 HR: 99  Site: --  Mode: --  
Vital Signs Last 24 Hrs  T(C): 36.7 (06-30-24 @ 07:30), Max: 36.7 (06-29-24 @ 19:33)  T(F): 98 (06-30-24 @ 07:30), Max: 98.1 (06-29-24 @ 19:33)  HR: --  BP: --  BP(mean): --  RR: 18 (06-30-24 @ 07:30) (18 - 18)  SpO2: --    Orthostatic VS  06-30-24 @ 07:30  Lying BP: --/-- HR: --  Sitting BP: 106/69 HR: 72  Standing BP: 106/70 HR: 83  Site: --  Mode: --  Orthostatic VS  06-29-24 @ 19:33  Lying BP: --/-- HR: --  Sitting BP: 129/82 HR: 76  Standing BP: 125/81 HR: 89  Site: --  Mode: --  Orthostatic VS  06-29-24 @ 07:39  Lying BP: --/-- HR: --  Sitting BP: 116/68 HR: 72  Standing BP: 112/63 HR: 74  Site: --  Mode: --  Orthostatic VS  06-28-24 @ 19:23  Lying BP: --/-- HR: --  Sitting BP: 130/77 HR: 75  Standing BP: 128/85 HR: 91  Site: --  Mode: --  
Vital Signs Last 24 Hrs  T(C): 36.6 (07-01-24 @ 07:59), Max: 36.8 (06-30-24 @ 19:08)  T(F): 97.8 (07-01-24 @ 07:59), Max: 98.2 (06-30-24 @ 19:08)  HR: --  BP: --  BP(mean): --  RR: --  SpO2: --    Orthostatic VS  07-01-24 @ 07:59  Lying BP: --/-- HR: --  Sitting BP: 120/71 HR: 89  Standing BP: 121/76 HR: 100  Site: upper left arm  Mode: electronic  Orthostatic VS  06-30-24 @ 19:08  Lying BP: --/-- HR: --  Sitting BP: 125/75 HR: 78  Standing BP: 112/78 HR: 94  Site: --  Mode: --  Orthostatic VS  06-30-24 @ 07:30  Lying BP: --/-- HR: --  Sitting BP: 106/69 HR: 72  Standing BP: 106/70 HR: 83  Site: --  Mode: --  Orthostatic VS  06-29-24 @ 19:33  Lying BP: --/-- HR: --  Sitting BP: 129/82 HR: 76  Standing BP: 125/81 HR: 89  Site: --  Mode: --  
Vital Signs Last 24 Hrs  T(C): 36.4 (06-29-24 @ 07:39), Max: 36.4 (06-28-24 @ 19:23)  T(F): 97.6 (06-29-24 @ 07:39), Max: 97.6 (06-29-24 @ 07:39)  HR: --  BP: --  BP(mean): --  RR: --  SpO2: --    Orthostatic VS  06-29-24 @ 07:39  Lying BP: --/-- HR: --  Sitting BP: 116/68 HR: 72  Standing BP: 112/63 HR: 74  Site: --  Mode: --  Orthostatic VS  06-28-24 @ 19:23  Lying BP: --/-- HR: --  Sitting BP: 130/77 HR: 75  Standing BP: 128/85 HR: 91  Site: --  Mode: --  Orthostatic VS  06-28-24 @ 07:35  Lying BP: --/-- HR: --  Sitting BP: 111/73 HR: 86  Standing BP: 112/78 HR: 101  Site: --  Mode: --  Orthostatic VS  06-27-24 @ 19:36  Lying BP: --/-- HR: --  Sitting BP: 134/82 HR: --  Standing BP: 125/81 HR: 90  Site: --  Mode: --  
Vital Signs Last 24 Hrs  T(C): 36.6 (06-28-24 @ 07:35), Max: 36.6 (06-28-24 @ 07:35)  T(F): 97.8 (06-28-24 @ 07:35), Max: 97.8 (06-28-24 @ 07:35)  HR: --  BP: --  BP(mean): --  RR: 17 (06-28-24 @ 07:35) (17 - 17)  SpO2: --    Orthostatic VS  06-28-24 @ 07:35  Lying BP: --/-- HR: --  Sitting BP: 111/73 HR: 86  Standing BP: 112/78 HR: 101  Site: --  Mode: --  Orthostatic VS  06-27-24 @ 19:36  Lying BP: --/-- HR: --  Sitting BP: 134/82 HR: --  Standing BP: 125/81 HR: 90  Site: --  Mode: --  Orthostatic VS  06-27-24 @ 07:38  Lying BP: --/-- HR: --  Sitting BP: 120/71 HR: 84  Standing BP: 110/73 HR: 98  Site: --  Mode: --  Orthostatic VS  06-26-24 @ 19:23  Lying BP: --/-- HR: --  Sitting BP: 124/74 HR: 73  Standing BP: 126/77 HR: 89  Site: --  Mode: --  
Vital Signs Last 24 Hrs  T(C): 36.6 (07-09-24 @ 08:04), Max: 36.6 (07-09-24 @ 08:04)  T(F): 97.9 (07-09-24 @ 08:04), Max: 97.9 (07-09-24 @ 08:04)  HR: --  BP: --  BP(mean): --  RR: 16 (07-09-24 @ 08:04) (16 - 18)  SpO2: --    Orthostatic VS  07-09-24 @ 08:04  Lying BP: --/-- HR: --  Sitting BP: 115/74 HR: 87  Standing BP: 108/68 HR: 106  Site: --  Mode: --  Orthostatic VS  07-08-24 @ 19:19  Lying BP: --/-- HR: --  Sitting BP: 131/61 HR: 70  Standing BP: 127/74 HR: 95  Site: --  Mode: --  Orthostatic VS  07-08-24 @ 08:30  Lying BP: --/-- HR: --  Sitting BP: 126/60 HR: 98  Standing BP: 117/58 HR: 107  Site: upper left arm  Mode: electronic  Orthostatic VS  07-07-24 @ 19:37  Lying BP: --/-- HR: --  Sitting BP: 142/75 HR: 88  Standing BP: 135/79 HR: 98  Site: --  Mode: electronic  
Vital Signs Last 24 Hrs  T(C): 36.6 (06-27-24 @ 07:38), Max: 36.6 (06-27-24 @ 07:38)  T(F): 97.9 (06-27-24 @ 07:38), Max: 97.9 (06-27-24 @ 07:38)  HR: --  BP: --  BP(mean): --  RR: 17 (06-27-24 @ 07:38) (17 - 17)  SpO2: --    Orthostatic VS  06-27-24 @ 07:38  Lying BP: --/-- HR: --  Sitting BP: 120/71 HR: 84  Standing BP: 110/73 HR: 98  Site: --  Mode: --  Orthostatic VS  06-26-24 @ 19:23  Lying BP: --/-- HR: --  Sitting BP: 124/74 HR: 73  Standing BP: 126/77 HR: 89  Site: --  Mode: --  Orthostatic VS  06-26-24 @ 07:35  Lying BP: --/-- HR: --  Sitting BP: 111/79 HR: 74  Standing BP: 123/77 HR: 89  Site: --  Mode: electronic  Orthostatic VS  06-25-24 @ 19:17  Lying BP: --/-- HR: --  Sitting BP: 107/68 HR: 109  Standing BP: 99/61 HR: 119  Site: --  Mode: --  
Vital Signs Last 24 Hrs  T(C): 36.5 (07-03-24 @ 08:35), Max: 36.5 (07-03-24 @ 08:35)  T(F): 97.7 (07-03-24 @ 08:35), Max: 97.7 (07-03-24 @ 08:35)  HR: --  BP: --  BP(mean): --  RR: 17 (07-03-24 @ 07:10) (17 - 17)  SpO2: --    Orthostatic VS  07-03-24 @ 08:35  Lying BP: --/-- HR: --  Sitting BP: 112/74 HR: 89  Standing BP: 104/70 HR: 105  Site: --  Mode: electronic  Orthostatic VS  07-02-24 @ 19:18  Lying BP: --/-- HR: --  Sitting BP: 130/70 HR: 78  Standing BP: 143/83 HR: 94  Site: --  Mode: --  Orthostatic VS  07-02-24 @ 08:04  Lying BP: --/-- HR: --  Sitting BP: 122/73 HR: 83  Standing BP: 112/73 HR: 98  Site: --  Mode: --  Orthostatic VS  07-01-24 @ 19:14  Lying BP: --/-- HR: --  Sitting BP: 124/75 HR: 69  Standing BP: 127/91 HR: 86  Site: --  Mode: --

## 2024-07-09 NOTE — BH INPATIENT PSYCHIATRY PROGRESS NOTE - NSICDXBHSECONDARYDX_PSY_ALL_CORE
Systemic lupus erythematosus   M32.9  

## 2024-07-09 NOTE — BH INPATIENT PSYCHIATRY PROGRESS NOTE - PRN MEDS
MEDICATIONS  (PRN):  acetaminophen     Tablet .. 650 milliGRAM(s) Oral every 6 hours PRN Mild Pain (1 - 3), Moderate Pain (4 - 6), Severe Pain (7 - 10)  nicotine  Polacrilex Gum 2 milliGRAM(s) Oral every 2 hours PRN Smoking Cessation  OLANZapine 5 milliGRAM(s) Oral every 8 hours PRN agitation  
MEDICATIONS  (PRN):  acetaminophen     Tablet .. 650 milliGRAM(s) Oral every 6 hours PRN Mild Pain (1 - 3), Moderate Pain (4 - 6), Severe Pain (7 - 10)  lidocaine 5% Ointment 1 Application(s) Topical two times a day PRN lupus flare pain  nicotine  Polacrilex Gum 2 milliGRAM(s) Oral every 2 hours PRN Smoking Cessation  OLANZapine 5 milliGRAM(s) Oral every 8 hours PRN agitation

## 2024-07-09 NOTE — BH INPATIENT PSYCHIATRY PROGRESS NOTE - NSTXMEDICDATETRGT_PSY_ALL_CORE
03-Jul-2024
03-Jul-2024
11-Jul-2024
03-Jul-2024
10-Jul-2024

## 2024-07-09 NOTE — BH INPATIENT PSYCHIATRY PROGRESS NOTE - NSBHMSEKNOW_PSY_A_CORE
Normal
Unable to assess
Unable to assess
Normal
Unable to assess
Normal

## 2024-07-09 NOTE — BH INPATIENT PSYCHIATRY PROGRESS NOTE - NSTXDCOTHRDATEEST_PSY_ALL_CORE
26-Jun-2024
03-Jul-2024
26-Jun-2024
26-Jun-2024
03-Jul-2024
03-Jul-2024
26-Jun-2024
03-Jul-2024
26-Jun-2024

## 2024-07-09 NOTE — BH INPATIENT PSYCHIATRY PROGRESS NOTE - NSBHMSETHTPROC_PSY_A_CORE
Disorganized/Perseverative/Illogical/Impaired reasoning
Disorganized/Perseverative/Illogical/Impaired reasoning
Perseverative/Illogical/Impaired reasoning
Perseverative/Illogical/Impaired reasoning
Disorganized/Perseverative/Illogical/Impaired reasoning
Perseverative
Perseverative/Illogical/Impaired reasoning

## 2024-07-09 NOTE — BH INPATIENT PSYCHIATRY PROGRESS NOTE - NSBHASSESSSUMMFT_PSY_ALL_CORE
60 y/o F, domiciled alone in private residence unemployed, supportive by her sister Odalys ([phone]) who lives in [place] and is her legal guardian, PMHx for Lupus, PPHx schizophrenia, prior psych admission, last admitted to a hospital in [place] earlier this year, no known SA/NSSIB, no hx of aggression, no known substance use, no current adherent with meds or connected to OP care, who self presents to [facility] ED report recurrent bladder pain and was found to have possible UTI (however U/A was contimated as per [facility] hospitalist). On initial interview in the ED, pt was disorgnaized, paranoid, illogical and collateral from sister indicates this is how she presents when she has decompensated. she recently has not been eating or sleeping well and unable to care for safe. The pt runs off to different cities in the past when she decpmensates, where she finds herself homeless and in vulnerable situation. AS such, presented as an imminent danger to self and requires further hospitalizaton for stabilization, transferred to [facility].    #schizophrenia  -Start Zyprexa 5 mg qhs - refuses  -Added Ativan 1 mg q12    #lupus  -f/u hospitalist, need collateral about prior tx     #UTI  -initial UA sample contaminated as per hospitalist, will get U/A + culture prior to treating as no sxs of sepsis at this time       
60 y/o F, domiciled alone in private residence unemployed, supportive by her sister Odalys ([phone]) who lives in [place] and is her legal guardian, PMHx for Lupus, PPHx schizophrenia, prior psych admission, last admitted to a hospital in [place] earlier this year, no known SA/NSSIB, no hx of aggression, no known substance use, no current adherent with meds or connected to OP care, who self presents to [facility] ED report recurrent bladder pain and was found to have possible UTI (however U/A was contimated as per [facility] hospitalist). On initial interview in the ED, pt was disorgnaized, paranoid, illogical and collateral from sister indicates this is how she presents when she has decompensated. she recently has not been eating or sleeping well and unable to care for safe. The pt runs off to different cities in the past when she decpmensates, where she finds herself homeless and in vulnerable situation. AS such, presented as an imminent danger to self and requires further hospitalizaton for stabilization, transferred to [facility].    #schizophrenia  -Refusing Zyprexa - discontinue at this time, will continue to psychoeducat about antipsychotics   -c/w Ativan 1 mg q12    #lupus  -f/u hospitalist, need collateral about prior tx     #UTI  -initial UA sample contaminated as per hospitalist, will get U/A + culture prior to treating as no sxs of sepsis at this time       
58 y/o F, domiciled alone in private residence unemployed, supportive by her sister Odalys ([phone]) who lives in [place] and is her legal guardian, PMHx for Lupus, PPHx schizophrenia, prior psych admission, last admitted to a hospital in [place] earlier this year, no known SA/NSSIB, no hx of aggression, no known substance use, no current adherent with meds or connected to OP care, who self presents to [facility] ED report recurrent bladder pain and was found to have possible UTI (however U/A was contimated as per [facility] hospitalist). On initial interview in the ED, pt was disorgnaized, paranoid, illogical and collateral from sister indicates this is how she presents when she has decompensated. she recently has not been eating or sleeping well and unable to care for safe. The pt runs off to different cities in the past when she decpmensates, where she finds herself homeless and in vulnerable situation. AS such, presented as an imminent danger to self and requires further hospitalizaton for stabilization, transferred to [facility].    #schizophrenia  -Start Zyprexa 5 mg qhs - refuses  -Added Ativan 1 mg q12    #lupus  -f/u hospitalist, need collateral about prior tx     #UTI  -initial UA sample contaminated as per hospitalist, will get U/A + culture prior to treating as no sxs of sepsis at this time       
60 y/o F, domiciled alone in private residence unemployed, supportive by her sister Odalys ([phone]) who lives in [place] and is her legal guardian, PMHx for Lupus, PPHx schizophrenia, prior psych admission, last admitted to a hospital in [place] earlier this year, no known SA/NSSIB, no hx of aggression, no known substance use, no current adherent with meds or connected to OP care, who self presents to [facility] ED report recurrent bladder pain and was found to have possible UTI (however U/A was contimated as per [facility] hospitalist). On initial interview in the ED, pt was disorgnaized, paranoid, illogical and collateral from sister indicates this is how she presents when she has decompensated. she recently has not been eating or sleeping well and unable to care for safe. The pt runs off to different cities in the past when she decpmensates, where she finds herself homeless and in vulnerable situation. AS such, presented as an imminent danger to self and requires further hospitalizaton for stabilization, transferred to [facility].    #schizophrenia  -Start Zyprexa 5 mg qhs - refuses  -Added Ativan 1 mg q12    #lupus  -f/u hospitalist, need collateral about prior tx     #UTI  -initial UA sample contaminated as per hospitalist, will get U/A + culture prior to treating as no sxs of sepsis at this time       
58 y/o F, domiciled alone in private residence unemployed, supportive by her sister Odalys ([phone]) who lives in [place] and is her legal guardian, PMHx for Lupus, PPHx schizophrenia, prior psych admission, last admitted to a hospital in [place] earlier this year, no known SA/NSSIB, no hx of aggression, no known substance use, no current adherent with meds or connected to OP care, who self presents to [facility] ED report recurrent bladder pain and was found to have possible UTI (however U/A was contimated as per [facility] hospitalist). On initial interview in the ED, pt was disorgnaized, paranoid, illogical and collateral from sister indicates this is how she presents when she has decompensated. she recently has not been eating or sleeping well and unable to care for safe. The pt runs off to different cities in the past when she decpmensates, where she finds herself homeless and in vulnerable situation. AS such, presented as an imminent danger to self and requires further hospitalizaton for stabilization, transferred to [facility].    #schizophrenia  -Start Zyprexa 5 mg qhs - refuses  -Added Ativan 1 mg q12    #lupus  -f/u hospitalist, need collateral about prior tx     #UTI  -initial UA sample contaminated as per hospitalist, will get U/A + culture prior to treating as no sxs of sepsis at this time       
60 y/o F, domiciled alone in private residence unemployed, supportive by her sister Odalys ([phone]) who lives in [place] and is her legal guardian, PMHx for Lupus, PPHx schizophrenia, prior psych admission, last admitted to a hospital in [place] earlier this year, no known SA/NSSIB, no hx of aggression, no known substance use, no current adherent with meds or connected to OP care, who self presents to [facility] ED report recurrent bladder pain and was found to have possible UTI (however U/A was contimated as per [facility] hospitalist). On initial interview in the ED, pt was disorgnaized, paranoid, illogical and collateral from sister indicates this is how she presents when she has decompensated. she recently has not been eating or sleeping well and unable to care for safe. The pt runs off to different cities in the past when she decpmensates, where she finds herself homeless and in vulnerable situation. AS such, presented as an imminent danger to self and requires further hospitalizaton for stabilization, transferred to [facility].    #schizophrenia  -Start Zyprexa 5 mg qhs    #lupus  -f/u hospitalist, need collateral about prior tx     #UTI  -initial UA sample contaminated as per hospitalist, will get U/A + culture prior to treating as no sxs of sepsis at this time       
58 y/o F, domiciled alone in private residence unemployed, supportive by her sister Odalys ([phone]) who lives in [place] and is her legal guardian, PMHx for Lupus, PPHx schizophrenia, prior psych admission, last admitted to a hospital in [place] earlier this year, no known SA/NSSIB, no hx of aggression, no known substance use, no current adherent with meds or connected to OP care, who self presents to [facility] ED report recurrent bladder pain and was found to have possible UTI (however U/A was contimated as per [facility] hospitalist). On initial interview in the ED, pt was disorgnaized, paranoid, illogical and collateral from sister indicates this is how she presents when she has decompensated. she recently has not been eating or sleeping well and unable to care for safe. The pt runs off to different cities in the past when she decpmensates, where she finds herself homeless and in vulnerable situation. AS such, presented as an imminent danger to self and requires further hospitalizaton for stabilization, transferred to [facility].    #schizophrenia  -Start Zyprexa 5 mg qhs - refuses  -Added Ativan 1 mg q12    #lupus  -f/u hospitalist, need collateral about prior tx     #UTI  -initial UA sample contaminated as per hospitalist, will get U/A + culture prior to treating as no sxs of sepsis at this time       
60 y/o F, domiciled alone in private residence unemployed, supportive by her sister Odalys ([phone]) who lives in [place] and is her legal guardian, PMHx for Lupus, PPHx schizophrenia, prior psych admission, last admitted to a hospital in [place] earlier this year, no known SA/NSSIB, no hx of aggression, no known substance use, no current adherent with meds or connected to OP care, who self presents to [facility] ED report recurrent bladder pain and was found to have possible UTI (however U/A was contimated as per [facility] hospitalist). On initial interview in the ED, pt was disorgnaized, paranoid, illogical and collateral from sister indicates this is how she presents when she has decompensated. she recently has not been eating or sleeping well and unable to care for safe. The pt runs off to different cities in the past when she decpmensates, where she finds herself homeless and in vulnerable situation. AS such, presented as an imminent danger to self and requires further hospitalizaton for stabilization, transferred to [facility].    #schizophrenia  -Refusing Zyprexa - discontinue at this time, will continue to psychoeducate about antipsychotics   -c/w Ativan 1 mg q12    #lupus  -f/u hospitalist, need collateral about prior tx     #UTI  -initial UA sample contaminated as per hospitalist, will get U/A + culture prior to treating as no sxs of sepsis at this time       
60 y/o F, domiciled alone in private residence unemployed, supportive by her sister Odalys ([phone]) who lives in [place] and is her legal guardian, PMHx for Lupus, PPHx schizophrenia, prior psych admission, last admitted to a hospital in [place] earlier this year, no known SA/NSSIB, no hx of aggression, no known substance use, no current adherent with meds or connected to OP care, who self presents to [facility] ED report recurrent bladder pain and was found to have possible UTI (however U/A was contimated as per [facility] hospitalist). On initial interview in the ED, pt was disorgnaized, paranoid, illogical and collateral from sister indicates this is how she presents when she has decompensated. she recently has not been eating or sleeping well and unable to care for safe. The pt runs off to different cities in the past when she decpmensates, where she finds herself homeless and in vulnerable situation. AS such, presented as an imminent danger to self and requires further hospitalizaton for stabilization, transferred to [facility].    #schizophrenia  -Start Zyprexa 5 mg qhs - refuses  -Added Ativan 1 mg q12    #lupus  -f/u hospitalist, need collateral about prior tx     #UTI  -initial UA sample contaminated as per hospitalist, will get U/A + culture prior to treating as no sxs of sepsis at this time       
60 y/o F, domiciled alone in private residence unemployed, supportive by her sister Odalys ([phone]) who lives in [place] and is her legal guardian, PMHx for Lupus, PPHx schizophrenia, prior psych admission, last admitted to a hospital in [place] earlier this year, no known SA/NSSIB, no hx of aggression, no known substance use, no current adherent with meds or connected to OP care, who self presents to [facility] ED report recurrent bladder pain and was found to have possible UTI (however U/A was contimated as per [facility] hospitalist). On initial interview in the ED, pt was disorgnaized, paranoid, illogical and collateral from sister indicates this is how she presents when she has decompensated. she recently has not been eating or sleeping well and unable to care for safe. The pt runs off to different cities in the past when she decpmensates, where she finds herself homeless and in vulnerable situation. AS such, presented as an imminent danger to self and requires further hospitalizaton for stabilization, transferred to [facility].    #schizophrenia  -Refusing Zyprexa - discontinue at this time, will continue to psychoeducate about antipsychotics   -c/w Ativan 1 mg q12    #lupus  -f/u hospitalist, need collateral about prior tx     #UTI  -initial UA sample contaminated as per hospitalist, will get U/A + culture prior to treating as no sxs of sepsis at this time       
60 y/o F, domiciled alone in private residence unemployed, supportive by her sister Odalys ([phone]) who lives in [place] and is her legal guardian, PMHx for Lupus, PPHx schizophrenia, prior psych admission, last admitted to a hospital in [place] earlier this year, no known SA/NSSIB, no hx of aggression, no known substance use, no current adherent with meds or connected to OP care, who self presents to [facility] ED report recurrent bladder pain and was found to have possible UTI (however U/A was contimated as per [facility] hospitalist). On initial interview in the ED, pt was disorgnaized, paranoid, illogical and collateral from sister indicates this is how she presents when she has decompensated. she recently has not been eating or sleeping well and unable to care for safe. The pt runs off to different cities in the past when she decpmensates, where she finds herself homeless and in vulnerable situation. AS such, presented as an imminent danger to self and requires further hospitalizaton for stabilization, transferred to [facility].    #schizophrenia  -Start Zyprexa 5 mg qhs - refuses  -Added Ativan 1 mg q12    #lupus  -f/u hospitalist, need collateral about prior tx     #UTI  -initial UA sample contaminated as per hospitalist, will get U/A + culture prior to treating as no sxs of sepsis at this time

## 2024-07-09 NOTE — BH INPATIENT PSYCHIATRY PROGRESS NOTE - NSTXDCOTHRDATETRGT_PSY_ALL_CORE
10-Jul-2024
03-Jul-2024
10-Jul-2024
03-Jul-2024
10-Jul-2024
03-Jul-2024
10-Jul-2024

## 2024-07-09 NOTE — BH INPATIENT PSYCHIATRY PROGRESS NOTE - NSBHATTESTTYPEVISIT_PSY_A_CORE
Attending Only
BEST without on-site Attending supervision
Attending Only
BEST without on-site Attending supervision
BEST without on-site Attending supervision
Attending Only
Attending Only

## 2024-07-09 NOTE — BH INPATIENT PSYCHIATRY PROGRESS NOTE - NSTREATMENTCERTY_PSY_ALL_CORE
C/o here for STD check and vaginal irritation. Mask applied  
That inpatient services furnished since the previous certification or recertification were, and continue to be required: for treatment that could reasonably be expected to improve the patient's condition; or, for diagnostic study;    That the hospital records show that the services furnished were: intensive treatment services; admission and related services necessary for diagnostic study or equivalent services;    That the patient continues to need, on a daily basis active inpatient treatment furnished directly by or requiring the supervision of inpatient psychiatric facility personnel.

## 2024-07-09 NOTE — BH INPATIENT PSYCHIATRY PROGRESS NOTE - NSICDXBHPRIMARYDX_PSY_ALL_CORE
Paranoid schizophrenia   F20.0  

## 2024-07-09 NOTE — BH INPATIENT PSYCHIATRY PROGRESS NOTE - NSBHMSETHTCONTENT_PSY_A_CORE
Delusions/Preoccupations/Other
Delusions/Preoccupations
Delusions/Preoccupations/Other
Delusions/Preoccupations
Delusions/Preoccupations
Delusions/Preoccupations/Other

## 2024-07-09 NOTE — BH INPATIENT PSYCHIATRY PROGRESS NOTE - NSBHMETABOLIC_PSY_ALL_CORE_FT
BMI: BMI (kg/m2): 17.8 (06-25-24 @ 14:20)  HbA1c:   Glucose:   BP: 115/82 (06-25-24 @ 14:20) (115/82 - 115/82)Vital Signs Last 24 Hrs  T(C): 36.6 (06-27-24 @ 07:38), Max: 36.6 (06-27-24 @ 07:38)  T(F): 97.9 (06-27-24 @ 07:38), Max: 97.9 (06-27-24 @ 07:38)  HR: --  BP: --  BP(mean): --  RR: 17 (06-27-24 @ 07:38) (17 - 17)  SpO2: --    Orthostatic VS  06-27-24 @ 07:38  Lying BP: --/-- HR: --  Sitting BP: 120/71 HR: 84  Standing BP: 110/73 HR: 98  Site: --  Mode: --  Orthostatic VS  06-26-24 @ 19:23  Lying BP: --/-- HR: --  Sitting BP: 124/74 HR: 73  Standing BP: 126/77 HR: 89  Site: --  Mode: --  Orthostatic VS  06-26-24 @ 07:35  Lying BP: --/-- HR: --  Sitting BP: 111/79 HR: 74  Standing BP: 123/77 HR: 89  Site: --  Mode: electronic  Orthostatic VS  06-25-24 @ 19:17  Lying BP: --/-- HR: --  Sitting BP: 107/68 HR: 109  Standing BP: 99/61 HR: 119  Site: --  Mode: --    Lipid Panel: 
BMI: BMI (kg/m2): 17.8 (06-25-24 @ 14:20)  HbA1c:   Glucose:   BP: --Vital Signs Last 24 Hrs  T(C): 36.4 (06-29-24 @ 07:39), Max: 36.4 (06-28-24 @ 19:23)  T(F): 97.6 (06-29-24 @ 07:39), Max: 97.6 (06-29-24 @ 07:39)  HR: --  BP: --  BP(mean): --  RR: --  SpO2: --    Orthostatic VS  06-29-24 @ 07:39  Lying BP: --/-- HR: --  Sitting BP: 116/68 HR: 72  Standing BP: 112/63 HR: 74  Site: --  Mode: --  Orthostatic VS  06-28-24 @ 19:23  Lying BP: --/-- HR: --  Sitting BP: 130/77 HR: 75  Standing BP: 128/85 HR: 91  Site: --  Mode: --  Orthostatic VS  06-28-24 @ 07:35  Lying BP: --/-- HR: --  Sitting BP: 111/73 HR: 86  Standing BP: 112/78 HR: 101  Site: --  Mode: --  Orthostatic VS  06-27-24 @ 19:36  Lying BP: --/-- HR: --  Sitting BP: 134/82 HR: --  Standing BP: 125/81 HR: 90  Site: --  Mode: --    Lipid Panel: 
BMI: BMI (kg/m2): 17.8 (06-25-24 @ 14:20)  HbA1c:   Glucose:   BP: --Vital Signs Last 24 Hrs  T(C): 36.5 (07-03-24 @ 08:35), Max: 36.5 (07-03-24 @ 08:35)  T(F): 97.7 (07-03-24 @ 08:35), Max: 97.7 (07-03-24 @ 08:35)  HR: --  BP: --  BP(mean): --  RR: 17 (07-03-24 @ 07:10) (17 - 17)  SpO2: --    Orthostatic VS  07-03-24 @ 08:35  Lying BP: --/-- HR: --  Sitting BP: 112/74 HR: 89  Standing BP: 104/70 HR: 105  Site: --  Mode: electronic  Orthostatic VS  07-02-24 @ 19:18  Lying BP: --/-- HR: --  Sitting BP: 130/70 HR: 78  Standing BP: 143/83 HR: 94  Site: --  Mode: --  Orthostatic VS  07-02-24 @ 08:04  Lying BP: --/-- HR: --  Sitting BP: 122/73 HR: 83  Standing BP: 112/73 HR: 98  Site: --  Mode: --  Orthostatic VS  07-01-24 @ 19:14  Lying BP: --/-- HR: --  Sitting BP: 124/75 HR: 69  Standing BP: 127/91 HR: 86  Site: --  Mode: --    Lipid Panel: 
BMI: BMI (kg/m2): 17.8 (06-25-24 @ 14:20)  HbA1c:   Glucose:   BP: --Vital Signs Last 24 Hrs  T(C): 36.6 (07-02-24 @ 08:04), Max: 36.6 (07-02-24 @ 08:04)  T(F): 97.8 (07-02-24 @ 08:04), Max: 97.8 (07-02-24 @ 08:04)  HR: --  BP: --  BP(mean): --  RR: 18 (07-02-24 @ 08:04) (18 - 18)  SpO2: --    Orthostatic VS  07-02-24 @ 08:04  Lying BP: --/-- HR: --  Sitting BP: 122/73 HR: 83  Standing BP: 112/73 HR: 98  Site: --  Mode: --  Orthostatic VS  07-01-24 @ 19:14  Lying BP: --/-- HR: --  Sitting BP: 124/75 HR: 69  Standing BP: 127/91 HR: 86  Site: --  Mode: --  Orthostatic VS  07-01-24 @ 07:59  Lying BP: --/-- HR: --  Sitting BP: 120/71 HR: 89  Standing BP: 121/76 HR: 100  Site: upper left arm  Mode: electronic  Orthostatic VS  06-30-24 @ 19:08  Lying BP: --/-- HR: --  Sitting BP: 125/75 HR: 78  Standing BP: 112/78 HR: 94  Site: --  Mode: --    Lipid Panel: 
BMI: BMI (kg/m2): 17.8 (06-25-24 @ 14:20)  HbA1c:   Glucose:   BP: --Vital Signs Last 24 Hrs  T(C): 36.7 (06-30-24 @ 07:30), Max: 36.7 (06-29-24 @ 19:33)  T(F): 98 (06-30-24 @ 07:30), Max: 98.1 (06-29-24 @ 19:33)  HR: --  BP: --  BP(mean): --  RR: 18 (06-30-24 @ 07:30) (18 - 18)  SpO2: --    Orthostatic VS  06-30-24 @ 07:30  Lying BP: --/-- HR: --  Sitting BP: 106/69 HR: 72  Standing BP: 106/70 HR: 83  Site: --  Mode: --  Orthostatic VS  06-29-24 @ 19:33  Lying BP: --/-- HR: --  Sitting BP: 129/82 HR: 76  Standing BP: 125/81 HR: 89  Site: --  Mode: --  Orthostatic VS  06-29-24 @ 07:39  Lying BP: --/-- HR: --  Sitting BP: 116/68 HR: 72  Standing BP: 112/63 HR: 74  Site: --  Mode: --  Orthostatic VS  06-28-24 @ 19:23  Lying BP: --/-- HR: --  Sitting BP: 130/77 HR: 75  Standing BP: 128/85 HR: 91  Site: --  Mode: --    Lipid Panel: 
BMI: BMI (kg/m2): 17.8 (06-25-24 @ 14:20)  HbA1c:   Glucose:   BP: --Vital Signs Last 24 Hrs  T(C): 36.2 (07-08-24 @ 08:30), Max: 36.3 (07-07-24 @ 19:37)  T(F): 97.2 (07-08-24 @ 08:30), Max: 97.4 (07-07-24 @ 19:37)  HR: --  BP: --  BP(mean): --  RR: 17 (07-08-24 @ 07:26) (17 - 17)  SpO2: --    Orthostatic VS  07-08-24 @ 08:30  Lying BP: --/-- HR: --  Sitting BP: 126/60 HR: 98  Standing BP: 117/58 HR: 107  Site: upper left arm  Mode: electronic  Orthostatic VS  07-07-24 @ 19:37  Lying BP: --/-- HR: --  Sitting BP: 142/75 HR: 88  Standing BP: 135/79 HR: 98  Site: --  Mode: electronic  Orthostatic VS  07-07-24 @ 07:47  Lying BP: --/-- HR: --  Sitting BP: 112/63 HR: 90  Standing BP: 106/60 HR: 108  Site: --  Mode: --  Orthostatic VS  07-06-24 @ 19:26  Lying BP: --/-- HR: --  Sitting BP: 137/81 HR: 87  Standing BP: 135/85 HR: 90  Site: --  Mode: --    Lipid Panel: 
BMI: BMI (kg/m2): 17.8 (06-25-24 @ 14:20)  HbA1c:   Glucose:   BP: 115/82 (06-25-24 @ 14:20) (115/82 - 115/82)Vital Signs Last 24 Hrs  T(C): 36.6 (06-26-24 @ 07:35), Max: 36.7 (06-25-24 @ 14:20)  T(F): 97.9 (06-26-24 @ 07:35), Max: 98 (06-25-24 @ 14:20)  HR: 97 (06-25-24 @ 14:20) (97 - 97)  BP: 115/82 (06-25-24 @ 14:20) (115/82 - 115/82)  BP(mean): --  RR: 16 (06-26-24 @ 07:35) (16 - 16)  SpO2: --    Orthostatic VS  06-26-24 @ 07:35  Lying BP: --/-- HR: --  Sitting BP: 111/79 HR: 74  Standing BP: 123/77 HR: 89  Site: --  Mode: electronic  Orthostatic VS  06-25-24 @ 19:17  Lying BP: --/-- HR: --  Sitting BP: 107/68 HR: 109  Standing BP: 99/61 HR: 119  Site: --  Mode: --    Lipid Panel: 
BMI: BMI (kg/m2): 17.8 (06-25-24 @ 14:20)  HbA1c:   Glucose:   BP: --Vital Signs Last 24 Hrs  T(C): 36.6 (07-01-24 @ 07:59), Max: 36.8 (06-30-24 @ 19:08)  T(F): 97.8 (07-01-24 @ 07:59), Max: 98.2 (06-30-24 @ 19:08)  HR: --  BP: --  BP(mean): --  RR: --  SpO2: --    Orthostatic VS  07-01-24 @ 07:59  Lying BP: --/-- HR: --  Sitting BP: 120/71 HR: 89  Standing BP: 121/76 HR: 100  Site: upper left arm  Mode: electronic  Orthostatic VS  06-30-24 @ 19:08  Lying BP: --/-- HR: --  Sitting BP: 125/75 HR: 78  Standing BP: 112/78 HR: 94  Site: --  Mode: --  Orthostatic VS  06-30-24 @ 07:30  Lying BP: --/-- HR: --  Sitting BP: 106/69 HR: 72  Standing BP: 106/70 HR: 83  Site: --  Mode: --  Orthostatic VS  06-29-24 @ 19:33  Lying BP: --/-- HR: --  Sitting BP: 129/82 HR: 76  Standing BP: 125/81 HR: 89  Site: --  Mode: --    Lipid Panel: 
BMI: BMI (kg/m2): 17.8 (06-25-24 @ 14:20)  HbA1c:   Glucose:   BP: --Vital Signs Last 24 Hrs  T(C): 36.6 (07-09-24 @ 08:04), Max: 36.6 (07-09-24 @ 08:04)  T(F): 97.9 (07-09-24 @ 08:04), Max: 97.9 (07-09-24 @ 08:04)  HR: --  BP: --  BP(mean): --  RR: 16 (07-09-24 @ 08:04) (16 - 18)  SpO2: --    Orthostatic VS  07-09-24 @ 08:04  Lying BP: --/-- HR: --  Sitting BP: 115/74 HR: 87  Standing BP: 108/68 HR: 106  Site: --  Mode: --  Orthostatic VS  07-08-24 @ 19:19  Lying BP: --/-- HR: --  Sitting BP: 131/61 HR: 70  Standing BP: 127/74 HR: 95  Site: --  Mode: --  Orthostatic VS  07-08-24 @ 08:30  Lying BP: --/-- HR: --  Sitting BP: 126/60 HR: 98  Standing BP: 117/58 HR: 107  Site: upper left arm  Mode: electronic  Orthostatic VS  07-07-24 @ 19:37  Lying BP: --/-- HR: --  Sitting BP: 142/75 HR: 88  Standing BP: 135/79 HR: 98  Site: --  Mode: electronic    Lipid Panel: 
BMI: BMI (kg/m2): 17.8 (06-25-24 @ 14:20)  HbA1c:   Glucose:   BP: --Vital Signs Last 24 Hrs  T(C): 36.6 (06-28-24 @ 07:35), Max: 36.6 (06-28-24 @ 07:35)  T(F): 97.8 (06-28-24 @ 07:35), Max: 97.8 (06-28-24 @ 07:35)  HR: --  BP: --  BP(mean): --  RR: 17 (06-28-24 @ 07:35) (17 - 17)  SpO2: --    Orthostatic VS  06-28-24 @ 07:35  Lying BP: --/-- HR: --  Sitting BP: 111/73 HR: 86  Standing BP: 112/78 HR: 101  Site: --  Mode: --  Orthostatic VS  06-27-24 @ 19:36  Lying BP: --/-- HR: --  Sitting BP: 134/82 HR: --  Standing BP: 125/81 HR: 90  Site: --  Mode: --  Orthostatic VS  06-27-24 @ 07:38  Lying BP: --/-- HR: --  Sitting BP: 120/71 HR: 84  Standing BP: 110/73 HR: 98  Site: --  Mode: --  Orthostatic VS  06-26-24 @ 19:23  Lying BP: --/-- HR: --  Sitting BP: 124/74 HR: 73  Standing BP: 126/77 HR: 89  Site: --  Mode: --    Lipid Panel: 
BMI: BMI (kg/m2): 17.8 (06-25-24 @ 14:20)  HbA1c:   Glucose:   BP: --Vital Signs Last 24 Hrs  T(C): 37.1 (07-05-24 @ 07:35), Max: 37.1 (07-05-24 @ 07:35)  T(F): 98.8 (07-05-24 @ 07:35), Max: 98.8 (07-05-24 @ 07:35)  HR: --  BP: --  BP(mean): --  RR: 17 (07-05-24 @ 07:35) (17 - 17)  SpO2: --    Orthostatic VS  07-05-24 @ 07:35  Lying BP: --/-- HR: --  Sitting BP: 119/75 HR: 95  Standing BP: 107/62 HR: 91  Site: upper left arm  Mode: electronic  Orthostatic VS  07-04-24 @ 19:26  Lying BP: --/-- HR: --  Sitting BP: 116/76 HR: 76  Standing BP: 108/75 HR: 97  Site: --  Mode: --  Orthostatic VS  07-04-24 @ 08:55  Lying BP: --/-- HR: --  Sitting BP: 128/71 HR: 89  Standing BP: 126/74 HR: 103  Site: --  Mode: --  Orthostatic VS  07-03-24 @ 19:13  Lying BP: --/-- HR: --  Sitting BP: 134/73 HR: 82  Standing BP: 110/78 HR: 99  Site: --  Mode: --    Lipid Panel:

## 2024-07-09 NOTE — BH INPATIENT PSYCHIATRY PROGRESS NOTE - NSBHMSEMOOD_PSY_A_CORE
Anxious/Unable to assess
Anxious/Unable to assess
Anxious/Other
Anxious/Other
Anxious
Anxious/Other
Anxious/Unable to assess
Anxious
Anxious/Other

## 2024-07-09 NOTE — BH INPATIENT PSYCHIATRY PROGRESS NOTE - NSTXDCOTHRGOAL_PSY_ALL_CORE
Pt will show a reduction of symptoms and engage meaningfully with SW to identify a safe discharge plan. Pt will comply with recommended tx plan and medications for 5 days and identify 2 benefits for adhering to tx.
Pt will show a reduction of symptoms and engage meaningfully with SW to identify a safe discharge plan. Pt will comply with recommended tx plan and medications for 5 days and identify 2 benefits for adhering to tx.
Pt will show a reduction of symptoms and engage meaningfully with writer to identify a safe discharge plan. Pt will comply with recommended tx plan and medications for 5 days, identify 2 benefits for adhering to tx.
Pt will show a reduction of symptoms and engage meaningfully with SW to identify a safe discharge plan. Pt will comply with recommended tx plan and medications for 5 days and identify 2 benefits for adhering to tx.
Pt will show a reduction of symptoms and engage meaningfully with writer to identify a safe discharge plan. Pt will comply with recommended tx plan and medications for 5 days, identify 2 benefits for adhering to tx.
Pt will show a reduction of symptoms and engage meaningfully with writer to identify a safe discharge plan. Pt will comply with recommended tx plan and medications for 5 days, identify 2 benefits for adhering to tx.
Pt will show a reduction of symptoms and engage meaningfully with SW to identify a safe discharge plan. Pt will comply with recommended tx plan and medications for 5 days and identify 2 benefits for adhering to tx.
Pt will show a reduction of symptoms and engage meaningfully with writer to identify a safe discharge plan. Pt will comply with recommended tx plan and medications for 5 days, identify 2 benefits for adhering to tx.
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
## 2024-07-09 NOTE — BH INPATIENT PSYCHIATRY PROGRESS NOTE - NSBHMSEGAIT_PSY_A_CORE
Normal gait / station
Unable to assess
Normal gait / station
Unable to assess
Unable to assess
Normal gait / station
Normal gait / station

## 2024-07-09 NOTE — BH INPATIENT PSYCHIATRY PROGRESS NOTE - NSBHFUPINTERVALCCFT_PSY_A_CORE
reported feeling "frustrated". 
Pt seen f/u for psychosis
reported feeling "anxious". 
Pt seen f/u for psychosis
reported feeling "frustrated". 
reported feeling "frustrated". 
Pt seen f/u for psychosis
reported feeling "frustrated".

## 2024-07-09 NOTE — BH INPATIENT PSYCHIATRY PROGRESS NOTE - CURRENT MEDICATION
MEDICATIONS  (STANDING):  nicotine -   7 mG/24Hr(s) Patch 1 Patch Transdermal daily  OLANZapine 5 milliGRAM(s) Oral at bedtime  omega-3-Acid Ethyl Esters 4 Gram(s) Oral daily    MEDICATIONS  (PRN):  acetaminophen     Tablet .. 650 milliGRAM(s) Oral every 6 hours PRN Mild Pain (1 - 3), Moderate Pain (4 - 6), Severe Pain (7 - 10)  nicotine  Polacrilex Gum 2 milliGRAM(s) Oral every 2 hours PRN Smoking Cessation  OLANZapine 5 milliGRAM(s) Oral every 8 hours PRN agitation  
MEDICATIONS  (STANDING):  aspirin  chewable 81 milliGRAM(s) Oral daily  cholecalciferol 1000 Unit(s) Oral daily  LORazepam     Tablet 1 milliGRAM(s) Oral two times a day  OLANZapine 5 milliGRAM(s) Oral at bedtime  omega-3-Acid Ethyl Esters 4 Gram(s) Oral daily    MEDICATIONS  (PRN):  acetaminophen     Tablet .. 650 milliGRAM(s) Oral every 6 hours PRN Mild Pain (1 - 3), Moderate Pain (4 - 6), Severe Pain (7 - 10)  nicotine  Polacrilex Gum 2 milliGRAM(s) Oral every 2 hours PRN Smoking Cessation  OLANZapine 5 milliGRAM(s) Oral every 8 hours PRN agitation  
MEDICATIONS  (STANDING):  aspirin  chewable 81 milliGRAM(s) Oral daily  cholecalciferol 1000 Unit(s) Oral daily  LORazepam     Tablet 1 milliGRAM(s) Oral two times a day  omega-3-Acid Ethyl Esters 4 Gram(s) Oral daily    MEDICATIONS  (PRN):  acetaminophen     Tablet .. 650 milliGRAM(s) Oral every 6 hours PRN Mild Pain (1 - 3), Moderate Pain (4 - 6), Severe Pain (7 - 10)  nicotine  Polacrilex Gum 2 milliGRAM(s) Oral every 2 hours PRN Smoking Cessation  OLANZapine 5 milliGRAM(s) Oral every 8 hours PRN agitation  
MEDICATIONS  (STANDING):  aspirin  chewable 81 milliGRAM(s) Oral daily  LORazepam     Tablet 1 milliGRAM(s) Oral two times a day  nicotine -   7 mG/24Hr(s) Patch 1 Patch Transdermal daily  OLANZapine 5 milliGRAM(s) Oral at bedtime  omega-3-Acid Ethyl Esters 4 Gram(s) Oral daily    MEDICATIONS  (PRN):  acetaminophen     Tablet .. 650 milliGRAM(s) Oral every 6 hours PRN Mild Pain (1 - 3), Moderate Pain (4 - 6), Severe Pain (7 - 10)  nicotine  Polacrilex Gum 2 milliGRAM(s) Oral every 2 hours PRN Smoking Cessation  OLANZapine 5 milliGRAM(s) Oral every 8 hours PRN agitation  
MEDICATIONS  (STANDING):  aspirin  chewable 81 milliGRAM(s) Oral daily  cholecalciferol 1000 Unit(s) Oral daily  LORazepam     Tablet 1 milliGRAM(s) Oral two times a day  omega-3-Acid Ethyl Esters 4 Gram(s) Oral daily    MEDICATIONS  (PRN):  acetaminophen     Tablet .. 650 milliGRAM(s) Oral every 6 hours PRN Mild Pain (1 - 3), Moderate Pain (4 - 6), Severe Pain (7 - 10)  nicotine  Polacrilex Gum 2 milliGRAM(s) Oral every 2 hours PRN Smoking Cessation  OLANZapine 5 milliGRAM(s) Oral every 8 hours PRN agitation  
MEDICATIONS  (STANDING):  aspirin  chewable 81 milliGRAM(s) Oral daily  LORazepam     Tablet 1 milliGRAM(s) Oral two times a day  OLANZapine 5 milliGRAM(s) Oral at bedtime  omega-3-Acid Ethyl Esters 4 Gram(s) Oral daily    MEDICATIONS  (PRN):  acetaminophen     Tablet .. 650 milliGRAM(s) Oral every 6 hours PRN Mild Pain (1 - 3), Moderate Pain (4 - 6), Severe Pain (7 - 10)  nicotine  Polacrilex Gum 2 milliGRAM(s) Oral every 2 hours PRN Smoking Cessation  OLANZapine 5 milliGRAM(s) Oral every 8 hours PRN agitation  
MEDICATIONS  (STANDING):  aspirin  chewable 81 milliGRAM(s) Oral daily  cholecalciferol 1000 Unit(s) Oral daily  LORazepam     Tablet 1 milliGRAM(s) Oral two times a day  OLANZapine 5 milliGRAM(s) Oral at bedtime  omega-3-Acid Ethyl Esters 4 Gram(s) Oral daily    MEDICATIONS  (PRN):  acetaminophen     Tablet .. 650 milliGRAM(s) Oral every 6 hours PRN Mild Pain (1 - 3), Moderate Pain (4 - 6), Severe Pain (7 - 10)  nicotine  Polacrilex Gum 2 milliGRAM(s) Oral every 2 hours PRN Smoking Cessation  OLANZapine 5 milliGRAM(s) Oral every 8 hours PRN agitation  
MEDICATIONS  (STANDING):  aspirin  chewable 81 milliGRAM(s) Oral daily  LORazepam     Tablet 1 milliGRAM(s) Oral two times a day  OLANZapine 5 milliGRAM(s) Oral at bedtime  omega-3-Acid Ethyl Esters 4 Gram(s) Oral daily    MEDICATIONS  (PRN):  acetaminophen     Tablet .. 650 milliGRAM(s) Oral every 6 hours PRN Mild Pain (1 - 3), Moderate Pain (4 - 6), Severe Pain (7 - 10)  nicotine  Polacrilex Gum 2 milliGRAM(s) Oral every 2 hours PRN Smoking Cessation  OLANZapine 5 milliGRAM(s) Oral every 8 hours PRN agitation  
MEDICATIONS  (STANDING):  aspirin  chewable 81 milliGRAM(s) Oral daily  cholecalciferol 1000 Unit(s) Oral daily  LORazepam     Tablet 1 milliGRAM(s) Oral two times a day  omega-3-Acid Ethyl Esters 4 Gram(s) Oral daily    MEDICATIONS  (PRN):  acetaminophen     Tablet .. 650 milliGRAM(s) Oral every 6 hours PRN Mild Pain (1 - 3), Moderate Pain (4 - 6), Severe Pain (7 - 10)  lidocaine 5% Ointment 1 Application(s) Topical two times a day PRN lupus flare pain  nicotine  Polacrilex Gum 2 milliGRAM(s) Oral every 2 hours PRN Smoking Cessation  OLANZapine 5 milliGRAM(s) Oral every 8 hours PRN agitation

## 2024-07-09 NOTE — BH INPATIENT PSYCHIATRY PROGRESS NOTE - NSBHMSEAFFCONG_PSY_A_CORE
Non-congruent

## 2024-07-09 NOTE — BH INPATIENT PSYCHIATRY PROGRESS NOTE - NSDCCRITERIA_PSY_ALL_CORE
CGI < 3  decreased intensity of delusions   improved self care and overall ADLs 

## 2024-07-09 NOTE — BH INPATIENT PSYCHIATRY PROGRESS NOTE - NSBHATTESTBILLING_PSY_A_CORE
91664-Gfrvxpuyya OBS or IP - moderate complexity OR 35-49 mins
31514-Ylffzxeqgo OBS or IP - moderate complexity OR 35-49 mins
85545-Xmvkiujfeu OBS or IP - moderate complexity OR 35-49 mins
65312-Kpmmaxwguv OBS or IP - moderate complexity OR 35-49 mins
48616-Ioymrlktwp OBS or IP - moderate complexity OR 35-49 mins
68943-Zggrrvphdg OBS or IP - moderate complexity OR 35-49 mins
72892-Fstooigfjj OBS or IP - moderate complexity OR 35-49 mins
03136-Iigxlaeebh OBS or IP - moderate complexity OR 35-49 mins
26204-Qyqsnjfivn OBS or IP - moderate complexity OR 35-49 mins
48783-Cvvigsinny OBS or IP - moderate complexity OR 35-49 mins
60264-Keofnixjem OBS or IP - moderate complexity OR 35-49 mins

## 2024-07-10 VITALS — RESPIRATION RATE: 16 BRPM | TEMPERATURE: 98 F

## 2024-07-10 RX ADMIN — Medication 4 GRAM(S): at 08:33

## 2024-07-10 RX ADMIN — NICOTINE POLACRILEX 2 MILLIGRAM(S): 2 LOZENGE ORAL at 08:34

## 2024-07-10 RX ADMIN — NICOTINE POLACRILEX 2 MILLIGRAM(S): 2 LOZENGE ORAL at 04:42

## 2024-07-10 RX ADMIN — Medication 1 MILLIGRAM(S): at 08:33

## 2024-07-10 RX ADMIN — NICOTINE POLACRILEX 2 MILLIGRAM(S): 2 LOZENGE ORAL at 10:41

## 2024-07-10 RX ADMIN — Medication 650 MILLIGRAM(S): at 08:33

## 2024-07-10 RX ADMIN — Medication 1000 UNIT(S): at 08:33

## 2024-07-10 RX ADMIN — ASPIRIN 81 MILLIGRAM(S): 325 TABLET, FILM COATED ORAL at 08:33

## 2024-07-10 NOTE — BH DISCHARGE NOTE NURSING/SOCIAL WORK/PSYCH REHAB - NSDCPRRECOMMEND_PSY_ALL_CORE
Please follow up treatment with [facility]- Outpatient mental health appointment in person on 7/15/2024 at 8:30 AM.

## 2024-07-10 NOTE — BH DISCHARGE NOTE NURSING/SOCIAL WORK/PSYCH REHAB - NSDCPRGOAL_PSY_ALL_CORE
Pt is paranoid, anxious, irritable, demanding. Pt made minimal progress towards her discharge. Pt remains to be paranoid, anxious, irritable, grandiose, somatic preoccupied and demanding. Pt minimized all symptom, but continues to endorse paranoid thought and being somatic preoccupied and delusional. Pt has no insight in to her illness despite psychoeducation teaching by staff. Pt has been compliant with mediation and superficially agreed to comply with treatment. Pt was irritable and argumentative during the discharge meeting, remains to be verbalized her delusional thoughts towards her residential setting and her sister. Pt showed less than 10 % of group attendance. Pt was observed to be isolated, showed very minimal insight into her illness. Pt has participated partially in her safety plan despite prompting  due to psychosis.  Pt is paranoid, anxious, irritable, demanding. Pt made minimal progress towards her discharge. Pt remains to be paranoid, anxious, irritable, grandiose, somatic preoccupied and demanding. Pt minimized all symptom, but continues to endorse paranoid thought and being somatic preoccupied and delusional. Pt has no insight in to her illness despite psychoeducation teaching by staff. Pt has been compliant with mediation and superficially agreed to comply with treatment. Pt was irritable agitated, and argumentative during the discharge meeting, remains to be verbalized her delusional thoughts towards her residential setting and her sister.  Pt also argue with treatment about her diagnosis. Pt showed less than 10 % of group attendance. Pt was observed to be isolated, showed very minimal insight into her illness. Pt has participated in her safety plan with multiple attempts.

## 2024-07-10 NOTE — BH DISCHARGE NOTE NURSING/SOCIAL WORK/PSYCH REHAB - NSBHDCAPPT2DT_PSY_A_CORE
16-Jul-2024 17:30 Mirvaso Counseling: Mirvaso is a topical medication which can decrease superficial blood flow where applied. Side effects are uncommon and include stinging, redness and allergic reactions.

## 2024-07-10 NOTE — BH INPATIENT PSYCHIATRY DISCHARGE NOTE - NSBHASSESSSUMMFT_PSY_ALL_CORE
60 y/o F, domiciled alone in private residence unemployed, supportive by her sister Odalys ([phone]) who lives in [place] and is her legal guardian, PMHx for Lupus, PPHx schizophrenia, prior psych admission, last admitted to a hospital in [place] earlier this year, no known SA/NSSIB, no hx of aggression, no known substance use, no current adherent with meds or connected to OP care, who self presents to [facility] ED report recurrent bladder pain and was found to have possible UTI (however U/A was contimated as per [facility] hospitalist), transferred to [facility] for further psychiatric evaluation. On exam, pt with hx of paranoid ideation, perseverative towards her legal guardian and her living environmental, presentation consistent with schizophrenia. However, pt remains in good behavioral control, maintaining ADLs, able to be grossly linear despite paranoid ideation, denies AVH/SI/HI, with good sleep and appetite. Pt refusing antipsychotic medications, however accepting oral Ativan BID for anxiety secondary to her life stressors. At this time, did not warrant criteria for treatment over objection. Pt submitted request for release, and after discussion with hospitals , pt agreed to return home to primary residence with plan for long term housing, and discharged was ordered by  with a stay for proper discharge planning.

## 2024-07-10 NOTE — BH INPATIENT PSYCHIATRY DISCHARGE NOTE - HOSPITAL COURSE
On exam, pt with hx of paranoid ideation, perseverative towards her legal guardian and her living environmental, presentation consistent with schizophrenia. However, pt remains in good behavioral control, maintaining ADLs, able to be grossly linear despite paranoid ideation, denies AVH/SI/HI, with good sleep and appetite. Pt refusing antipsychotic medications (offered Abilify and Zyprexa), however accepting oral Ativan 1 mg BID for anxiety secondary to her life stressors. At this time, did not warrant criteria for treatment over objection. Pt submitted request for release, and after discussion with [facility] , pt agreed to return home to primary residence with plan for long term housing, and discharged was ordered by  with a stay for proper discharge planning.    On exam, pt with hx of paranoid ideation, perseverative towards her legal guardian and her living environmental, presentation consistent with schizophrenia. However, pt remains in good behavioral control, maintaining ADLs, able to be grossly linear despite paranoid ideation, denies AVH/SI/HI, with good sleep and appetite. Pt refusing antipsychotic medications (offered Abilify and Zyprexa), however accepting oral Ativan 1 mg BID for anxiety secondary to her life stressors. At this time, did not warrant criteria for treatment over objection. Pt submitted request for release, and after discussion with [facility] , pt agreed to return home to primary residence with plan for long term housing, and discharged was ordered by  with a stay for proper discharge planning. From a  perspective, discharge planning coordinated with pts legal guardian. A housing application was submitted for longer term supportive housing which pt agreed to.  Regarding her medical issues, pt was seen by hospitalist, reached out  to PCP in the community. pt given her back brace for osteoarthritis at this time there was no active lupus flare and recommend to f/u with OP PCP.     I have personally seen and evaluated patient prior to discharge. Chart reviewed and discharge plan discussed with the treatment as follows. Pt prior to discharge was calm, superficially cooperative. Patient has been attending select groups and interacting with certain peers. No recent behavioral problems and no episodes of aggressive or dangerous behavior. No problems with sleep, appetite or energy level. Denied any active and current manic, hypomanic, depressive. Endorses anxiety but has improved on Ativan. Pt continues to express chronic paranoid delusions at baseline, however they are less intense than upon admission and pt is not acting on them at this time. Denied any current SI/HI/AH/VH/PI. Patient is more organized and commits to safety and to ongoing outpatient treatment.   Pt asks relevant questions about his discharge plan and about the medication regimen. Pt articulate a plan for living life after discharge. Medication risks/benefits/side effects were discussed with the patient.  Patient expressed understanding and agreed with the treatment plan. Further, instructed the patient to seek help immediately by dialing "911" or by going to the nearest ER if symptoms worsen.   Chronic risk factors include hx of schizophrenia, multiple prior admissions, PMHx lupus   Protective factors include domiciled, previously employed, higher education, supportive sister/legal guardian, partially compliant with psychiatric medications, motivated to engage in outpateint psychiatric treatment, future oriented thinking, expresses concern for well being, intact ADLs and good self care, much aspirations for return to prior baseline and wish to pursue pleasurable activities.  As such, its chronic risk factors are mitigated by their protective factors. Pt does not pose an acute elevated risk of imminent danger to harm to self or others. Does not require further inpatient psychiatric admisison at this time. Psychiatrically cleared for discharge.   Pt urged to avoid using any alcohol or street drugs, particularly marijuana & K2, cocaine and methamphetamine (crystal meth) once discharged.  Safety plan filled out by patient and submitted into chart.  Pt requested release, discussed with MHLS and pt released by  with stay given pt was not an acute danger to self or others.

## 2024-07-10 NOTE — BH DISCHARGE NOTE NURSING/SOCIAL WORK/PSYCH REHAB - NSCDUDCCRISIS_PSY_A_CORE
[facility] Well  1 [phone] [facility]-WELL ([phone])  Text "WELL" to 57778  Website: www.[employer]/.[facility] 1 (595) 621-HOPE (4964) Website: www.safehorizon.org/.  [facility] - [facility] – Crisis Care for Children, Adults and Families  [address] [place] [address]  Mobile Crisis Hotline – (821) 681-5148/.National Suicide Prevention Lifeline 5 (717) 707-8209/.  [facility]  1 (778) LIFENET (857-0653)/.  [facility]  (530) 323-5876/.  [facility] Behavioral Health Helpline / [facility] Mobile Crisis  (261) 462-UQEM (3046)/.  [facility] Response Crisis Hotline  (613) 305-6647  24 hour telephone crisis intervention and suicide prevention hotline concerned with all mental health issues/.  [facility]  [address] [place] [address] [phone]   Hours:  Monday through Friday from 9 AM to 3 PM/988 Suicide and Crisis Lifeline

## 2024-07-10 NOTE — BH INPATIENT PSYCHIATRY DISCHARGE NOTE - ATTENDING DISCHARGE PHYSICAL EXAMINATION:
I have personally seen and evaluated patient prior to discharge. Chart reviewed and discharge plan discussed with the treatment as follows. Pt prior to discharge was calm, superficially cooperative. Patient has been attending select groups and interacting with certain peers. No recent behavioral problems and no episodes of aggressive or dangerous behavior. No problems with sleep, appetite or energy level. Denied any active and current manic, hypomanic, depressive. Endorses anxiety but has improved on Ativan. Pt continues to express chronic paranoid delusions at baseline, however they are less intense than upon admission and pt is not acting on them at this time. Denied any current SI/HI/AH/VH/PI. Patient is more organized and commits to safety and to ongoing outpatient treatment.   Pt asks relevant questions about his discharge plan and about the medication regimen. Pt articulate a plan for living life after discharge. Medication risks/benefits/side effects were discussed with the patient.  Patient expressed understanding and agreed with the treatment plan. Further, instructed the patient to seek help immediately by dialing "911" or by going to the nearest ER if symptoms worsen.   Chronic risk factors include hx of schizophrenia, multiple prior admissions, PMHx lupus   Protective factors include domiciled, previously employed, higher education, supportive sister/legal guardian, partially compliant with psychiatric medications, motivated to engage in outpateint psychiatric treatment, future oriented thinking, expresses concern for well being, intact ADLs and good self care, much aspirations for return to prior baseline and wish to pursue pleasurable activities.  As such, its chronic risk factors are mitigated by their protective factors. Pt does not pose an acute elevated risk of imminent danger to harm to self or others. Does not require further inpatient psychiatric admisison at this time. Psychiatrically cleared for discharge.   Pt urged to avoid using any alcohol or street drugs, particularly marijuana & K2, cocaine and methamphetamine (crystal meth) once discharged.  Safety plan filled out by patient and submitted into chart.

## 2024-07-10 NOTE — BH INPATIENT PSYCHIATRY DISCHARGE NOTE - NSDCMRMEDTOKEN_GEN_ALL_CORE_FT
aspirin 81 mg oral tablet, chewable: 1 tab(s) orally once a day  cholecalciferol oral tablet: 1000 unit(s) orally once a day  LORazepam 1 mg oral tablet: 1 tab(s) orally 2 times a day MDD: 2 mg  omega-3 polyunsaturated fatty acids ethyl esters 1000 mg oral capsule: 4 cap(s) orally once a day  omega-3 polyunsaturated fatty acids ethyl esters 1000 mg oral capsule: 4 cap(s) orally once a day

## 2024-07-10 NOTE — BH DISCHARGE NOTE NURSING/SOCIAL WORK/PSYCH REHAB - NSDCADDINFO1FT_PSY_ALL_CORE
You are being provided one in person appointment and one virtual appointment . please cancel whichever appointment you choose not to attend.

## 2024-07-10 NOTE — BH INPATIENT PSYCHIATRY DISCHARGE NOTE - NSBHMETABOLIC_PSY_ALL_CORE_FT
BMI: BMI (kg/m2): 17.8 (06-25-24 @ 14:20)  HbA1c:   Glucose:   BP: --Vital Signs Last 24 Hrs  T(C): 36.7 (07-10-24 @ 07:50), Max: 36.7 (07-10-24 @ 07:50)  T(F): 98.1 (07-10-24 @ 07:50), Max: 98.1 (07-10-24 @ 07:50)  HR: --  BP: --  BP(mean): --  RR: --  SpO2: --    Orthostatic VS  07-10-24 @ 07:50  Lying BP: --/-- HR: --  Sitting BP: 128/73 HR: 89  Standing BP: 118/62 HR: 100  Site: --  Mode: electronic  Orthostatic VS  07-09-24 @ 19:15  Lying BP: --/-- HR: --  Sitting BP: 131/73 HR: 85  Standing BP: 131/76 HR: 100  Site: --  Mode: --  Orthostatic VS  07-09-24 @ 08:04  Lying BP: --/-- HR: --  Sitting BP: 115/74 HR: 87  Standing BP: 108/68 HR: 106  Site: --  Mode: --  Orthostatic VS  07-08-24 @ 19:19  Lying BP: --/-- HR: --  Sitting BP: 131/61 HR: 70  Standing BP: 127/74 HR: 95  Site: --  Mode: --    Lipid Panel:
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
## 2024-07-10 NOTE — BH DISCHARGE NOTE NURSING/SOCIAL WORK/PSYCH REHAB - DISCHARGE INSTRUCTIONS AFTERCARE APPOINTMENTS
In order to check the location, date, or time of your aftercare appointment, please refer to your Discharge Instructions Document given to you upon leaving the hospital.  If you have lost the instructions please call [phone]

## 2024-07-10 NOTE — BH DISCHARGE NOTE NURSING/SOCIAL WORK/PSYCH REHAB - PATIENT PORTAL LINK FT
You can access the FollowMyHealth Patient Portal offered by [facility] by registering at the following website: [email or web address]. By joining [facility]’s FollowMyHealth portal, you will also be able to view your health information using other applications (apps) compatible with our system.

## 2024-07-26 NOTE — SOCIAL WORK POST DISCHARGE FOLLOW UP NOTE - NSBHSWFOLLOWUP_PSY_ALL_CORE_FT
Lmsw called pt's sister Odalys ([phone]) and left a voicemail regarding the following: First lmsw mentioned pt's missed follow up appointments and offer assistance in rescheduling. Second, that Blue from [facility] called with an available housing slot for the pt in [place] and that he requests a phone call back today before 3:00pm ([phone]), and finally, that  manager is requesting a copy of pt's ID be emailed to [email or web address] so that Medicaid can be completed. pt was court discharged and determined to be psychiatrically stable for discharge. case is closed.